# Patient Record
Sex: FEMALE | Race: WHITE | NOT HISPANIC OR LATINO | Employment: OTHER | URBAN - METROPOLITAN AREA
[De-identification: names, ages, dates, MRNs, and addresses within clinical notes are randomized per-mention and may not be internally consistent; named-entity substitution may affect disease eponyms.]

---

## 2017-05-30 ENCOUNTER — HOSPITAL ENCOUNTER (INPATIENT)
Facility: HOSPITAL | Age: 77
LOS: 8 days | Discharge: NURSING, REHAB OR LONG-TERM CARE | DRG: 481 | End: 2017-06-07
Attending: EMERGENCY MEDICINE | Admitting: INTERNAL MEDICINE
Payer: COMMERCIAL

## 2017-05-30 DIAGNOSIS — S72.009A HIP FRACTURE: ICD-10-CM

## 2017-05-30 DIAGNOSIS — M25.552 LEFT HIP PAIN: ICD-10-CM

## 2017-05-30 DIAGNOSIS — F10.229 ALCOHOL DEPENDENCE WITH INTOXICATION WITH COMPLICATION: ICD-10-CM
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
## 2017-05-30 DIAGNOSIS — S72.002A CLOSED LEFT HIP FRACTURE, INITIAL ENCOUNTER: Primary | ICD-10-CM

## 2017-05-30 DIAGNOSIS — F17.210 CIGARETTE NICOTINE DEPENDENCE WITHOUT COMPLICATION: ICD-10-CM

## 2017-05-30 DIAGNOSIS — S72.142A CLOSED INTERTROCHANTERIC FRACTURE OF LEFT FEMUR, INITIAL ENCOUNTER: ICD-10-CM

## 2017-05-30 LAB
ABO + RH BLD: NORMAL
ALBUMIN SERPL BCP-MCNC: 3.7 G/DL
ALP SERPL-CCNC: 69 U/L
ALT SERPL W/O P-5'-P-CCNC: 13 U/L
ANION GAP SERPL CALC-SCNC: 12 MMOL/L
APTT BLDCRRT: 26.6 SEC
AST SERPL-CCNC: 21 U/L
BASOPHILS # BLD AUTO: ABNORMAL K/UL
BASOPHILS NFR BLD: 0 %
BILIRUB SERPL-MCNC: 0.4 MG/DL
BLD GP AB SCN CELLS X3 SERPL QL: NORMAL
BUN SERPL-MCNC: 8 MG/DL
CALCIUM SERPL-MCNC: 9.1 MG/DL
CHLORIDE SERPL-SCNC: 101 MMOL/L
CO2 SERPL-SCNC: 24 MMOL/L
CREAT SERPL-MCNC: 0.6 MG/DL
DIFFERENTIAL METHOD: ABNORMAL
EOSINOPHIL # BLD AUTO: ABNORMAL K/UL
EOSINOPHIL NFR BLD: 1 %
ERYTHROCYTE [DISTWIDTH] IN BLOOD BY AUTOMATED COUNT: 14.1 %
EST. GFR  (AFRICAN AMERICAN): >60 ML/MIN/1.73 M^2
EST. GFR  (NON AFRICAN AMERICAN): >60 ML/MIN/1.73 M^2
ETHANOL SERPL-MCNC: 106 MG/DL
GLUCOSE SERPL-MCNC: 108 MG/DL
HCT VFR BLD AUTO: 39.5 %
HGB BLD-MCNC: 13.3 G/DL
INR PPP: 1
LYMPHOCYTES # BLD AUTO: ABNORMAL K/UL
LYMPHOCYTES NFR BLD: 7 %
MCH RBC QN AUTO: 32.8 PG
MCHC RBC AUTO-ENTMCNC: 33.7 %
MCV RBC AUTO: 97 FL
MONOCYTES # BLD AUTO: ABNORMAL K/UL
MONOCYTES NFR BLD: 5 %
NEUTROPHILS NFR BLD: 83 %
NEUTS BAND NFR BLD MANUAL: 4 %
PLATELET # BLD AUTO: 340 K/UL
PLATELET BLD QL SMEAR: ABNORMAL
PMV BLD AUTO: 6.8 FL
POTASSIUM SERPL-SCNC: 3.5 MMOL/L
PROT SERPL-MCNC: 6.8 G/DL
PROTHROMBIN TIME: 10.3 SEC
RBC # BLD AUTO: 4.06 M/UL
SODIUM SERPL-SCNC: 137 MMOL/L
WBC # BLD AUTO: 24.2 K/UL

## 2017-05-30 PROCEDURE — 96375 TX/PRO/DX INJ NEW DRUG ADDON: CPT

## 2017-05-30 PROCEDURE — 86900 BLOOD TYPING SEROLOGIC ABO: CPT

## 2017-05-30 PROCEDURE — 36415 COLL VENOUS BLD VENIPUNCTURE: CPT

## 2017-05-30 PROCEDURE — 96374 THER/PROPH/DIAG INJ IV PUSH: CPT

## 2017-05-30 PROCEDURE — 85730 THROMBOPLASTIN TIME PARTIAL: CPT

## 2017-05-30 PROCEDURE — 85610 PROTHROMBIN TIME: CPT

## 2017-05-30 PROCEDURE — 80053 COMPREHEN METABOLIC PANEL: CPT

## 2017-05-30 PROCEDURE — 85027 COMPLETE CBC AUTOMATED: CPT

## 2017-05-30 PROCEDURE — 93005 ELECTROCARDIOGRAM TRACING: CPT

## 2017-05-30 PROCEDURE — 99285 EMERGENCY DEPT VISIT HI MDM: CPT | Mod: 25

## 2017-05-30 PROCEDURE — 80320 DRUG SCREEN QUANTALCOHOLS: CPT

## 2017-05-30 PROCEDURE — 12000002 HC ACUTE/MED SURGE SEMI-PRIVATE ROOM

## 2017-05-30 PROCEDURE — 85007 BL SMEAR W/DIFF WBC COUNT: CPT

## 2017-05-30 PROCEDURE — 51702 INSERT TEMP BLADDER CATH: CPT

## 2017-05-30 PROCEDURE — 63600175 PHARM REV CODE 636 W HCPCS: Performed by: EMERGENCY MEDICINE

## 2017-05-30 PROCEDURE — 86901 BLOOD TYPING SEROLOGIC RH(D): CPT

## 2017-05-30 RX ORDER — HYDROMORPHONE HYDROCHLORIDE 1 MG/ML
0.5 INJECTION, SOLUTION INTRAMUSCULAR; INTRAVENOUS; SUBCUTANEOUS
Status: COMPLETED | OUTPATIENT
Start: 2017-05-30 | End: 2017-05-30

## 2017-05-30 RX ORDER — ONDANSETRON 2 MG/ML
4 INJECTION INTRAMUSCULAR; INTRAVENOUS
Status: COMPLETED | OUTPATIENT
Start: 2017-05-30 | End: 2017-05-30

## 2017-05-30 RX ORDER — MORPHINE SULFATE 4 MG/ML
4 INJECTION, SOLUTION INTRAMUSCULAR; INTRAVENOUS
Status: COMPLETED | OUTPATIENT
Start: 2017-05-30 | End: 2017-05-30

## 2017-05-30 RX ADMIN — HYDROMORPHONE HYDROCHLORIDE 0.5 MG: 1 INJECTION, SOLUTION INTRAMUSCULAR; INTRAVENOUS; SUBCUTANEOUS at 11:05

## 2017-05-30 RX ADMIN — ONDANSETRON 4 MG: 2 INJECTION INTRAMUSCULAR; INTRAVENOUS at 09:05

## 2017-05-30 RX ADMIN — MORPHINE SULFATE 4 MG: 4 INJECTION INTRAVENOUS at 09:05

## 2017-05-31 ENCOUNTER — ANESTHESIA (OUTPATIENT)
Dept: SURGERY | Facility: HOSPITAL | Age: 77
DRG: 481 | End: 2017-05-31
Payer: COMMERCIAL

## 2017-05-31 ENCOUNTER — ANESTHESIA EVENT (OUTPATIENT)
Dept: SURGERY | Facility: HOSPITAL | Age: 77
DRG: 481 | End: 2017-05-31
Payer: COMMERCIAL

## 2017-05-31 ENCOUNTER — SURGERY (OUTPATIENT)
Age: 77
End: 2017-05-31
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
## 2017-05-31 PROBLEM — F17.210 DEPENDENCE ON NICOTINE FROM CIGARETTES: Status: ACTIVE | Noted: 2017-05-31

## 2017-05-31 PROBLEM — D72.829 LEUKOCYTOSIS: Status: ACTIVE | Noted: 2017-05-31

## 2017-05-31 PROBLEM — F10.229 ALCOHOL DEPENDENCE WITH INTOXICATION: Status: ACTIVE | Noted: 2017-05-31

## 2017-05-31 PROBLEM — F10.920 ALCOHOLIC INTOXICATION WITHOUT COMPLICATION: Status: ACTIVE | Noted: 2017-05-31

## 2017-05-31 PROBLEM — R94.31 ABNORMAL EKG: Status: ACTIVE | Noted: 2017-05-31

## 2017-05-31 LAB
APTT BLDCRRT: 28.9 SEC
BACTERIA #/AREA URNS HPF: ABNORMAL /HPF
BASOPHILS # BLD AUTO: 0 K/UL
BASOPHILS NFR BLD: 0.1 %
BILIRUB UR QL STRIP: NEGATIVE
CLARITY UR: ABNORMAL
COLOR UR: YELLOW
DIFFERENTIAL METHOD: ABNORMAL
EOSINOPHIL # BLD AUTO: 0 K/UL
EOSINOPHIL NFR BLD: 0 %
ERYTHROCYTE [DISTWIDTH] IN BLOOD BY AUTOMATED COUNT: 13.8 %
ETHANOL SERPL-MCNC: <10 MG/DL
GLUCOSE UR QL STRIP: ABNORMAL
HCT VFR BLD AUTO: 39.2 %
HGB BLD-MCNC: 13.1 G/DL
HGB UR QL STRIP: ABNORMAL
INR PPP: 1
KETONES UR QL STRIP: ABNORMAL
LEUKOCYTE ESTERASE UR QL STRIP: ABNORMAL
LYMPHOCYTES # BLD AUTO: 1.2 K/UL
LYMPHOCYTES NFR BLD: 6.6 %
MCH RBC QN AUTO: 32.8 PG
MCHC RBC AUTO-ENTMCNC: 33.4 %
MCV RBC AUTO: 98 FL
MICROSCOPIC COMMENT: ABNORMAL
MONOCYTES # BLD AUTO: 1.2 K/UL
MONOCYTES NFR BLD: 6.8 %
NEUTROPHILS # BLD AUTO: 15.4 K/UL
NEUTROPHILS NFR BLD: 86.5 %
NITRITE UR QL STRIP: POSITIVE
PH UR STRIP: 6 [PH] (ref 5–8)
PLATELET # BLD AUTO: 315 K/UL
PMV BLD AUTO: 7.5 FL
PROT UR QL STRIP: ABNORMAL
PROTHROMBIN TIME: 10 SEC
RBC # BLD AUTO: 3.99 M/UL
RBC #/AREA URNS HPF: 2 /HPF (ref 0–4)
SP GR UR STRIP: 1.02 (ref 1–1.03)
SQUAMOUS #/AREA URNS HPF: 1 /HPF
URN SPEC COLLECT METH UR: ABNORMAL
UROBILINOGEN UR STRIP-ACNC: NEGATIVE EU/DL
WBC # BLD AUTO: 17.8 K/UL
WBC #/AREA URNS HPF: >100 /HPF (ref 0–5)

## 2017-05-31 PROCEDURE — 27000221 HC OXYGEN, UP TO 24 HOURS

## 2017-05-31 PROCEDURE — 71000039 HC RECOVERY, EACH ADD'L HOUR: Performed by: ORTHOPAEDIC SURGERY

## 2017-05-31 PROCEDURE — 63600175 PHARM REV CODE 636 W HCPCS: Performed by: ORTHOPAEDIC SURGERY

## 2017-05-31 PROCEDURE — 37000009 HC ANESTHESIA EA ADD 15 MINS: Performed by: ORTHOPAEDIC SURGERY

## 2017-05-31 PROCEDURE — 87088 URINE BACTERIA CULTURE: CPT

## 2017-05-31 PROCEDURE — 80320 DRUG SCREEN QUANTALCOHOLS: CPT

## 2017-05-31 PROCEDURE — 37000008 HC ANESTHESIA 1ST 15 MINUTES: Performed by: ORTHOPAEDIC SURGERY

## 2017-05-31 PROCEDURE — 36415 COLL VENOUS BLD VENIPUNCTURE: CPT

## 2017-05-31 PROCEDURE — 25000003 PHARM REV CODE 250: Performed by: ORTHOPAEDIC SURGERY

## 2017-05-31 PROCEDURE — 87086 URINE CULTURE/COLONY COUNT: CPT

## 2017-05-31 PROCEDURE — 63600175 PHARM REV CODE 636 W HCPCS: Performed by: NURSE PRACTITIONER

## 2017-05-31 PROCEDURE — 63600175 PHARM REV CODE 636 W HCPCS: Performed by: NURSE ANESTHETIST, CERTIFIED REGISTERED

## 2017-05-31 PROCEDURE — 99900103 DSU ONLY-NO CHARGE-INITIAL HR (STAT): Performed by: ORTHOPAEDIC SURGERY

## 2017-05-31 PROCEDURE — 27200651 HC AIRWAY, LMA: Performed by: NURSE ANESTHETIST, CERTIFIED REGISTERED

## 2017-05-31 PROCEDURE — 25000003 PHARM REV CODE 250: Performed by: INTERNAL MEDICINE

## 2017-05-31 PROCEDURE — D9220A PRA ANESTHESIA: Mod: CRNA,,, | Performed by: NURSE ANESTHETIST, CERTIFIED REGISTERED

## 2017-05-31 PROCEDURE — 36000711: Performed by: ORTHOPAEDIC SURGERY

## 2017-05-31 PROCEDURE — 25000003 PHARM REV CODE 250: Performed by: NURSE ANESTHETIST, CERTIFIED REGISTERED

## 2017-05-31 PROCEDURE — 85025 COMPLETE CBC W/AUTO DIFF WBC: CPT

## 2017-05-31 PROCEDURE — 25000003 PHARM REV CODE 250: Performed by: NURSE PRACTITIONER

## 2017-05-31 PROCEDURE — 71000033 HC RECOVERY, INTIAL HOUR: Performed by: ORTHOPAEDIC SURGERY

## 2017-05-31 PROCEDURE — 93005 ELECTROCARDIOGRAM TRACING: CPT

## 2017-05-31 PROCEDURE — 0QH736Z INSERTION OF INTRAMEDULLARY INTERNAL FIXATION DEVICE INTO LEFT UPPER FEMUR, PERCUTANEOUS APPROACH: ICD-10-PCS | Performed by: ORTHOPAEDIC SURGERY

## 2017-05-31 PROCEDURE — 36000710: Performed by: ORTHOPAEDIC SURGERY

## 2017-05-31 PROCEDURE — 85730 THROMBOPLASTIN TIME PARTIAL: CPT

## 2017-05-31 PROCEDURE — 27201423 OPTIME MED/SURG SUP & DEVICES STERILE SUPPLY: Performed by: ORTHOPAEDIC SURGERY

## 2017-05-31 PROCEDURE — 87186 SC STD MICRODIL/AGAR DIL: CPT

## 2017-05-31 PROCEDURE — 87077 CULTURE AEROBIC IDENTIFY: CPT

## 2017-05-31 PROCEDURE — 99223 1ST HOSP IP/OBS HIGH 75: CPT | Mod: ,,, | Performed by: INTERNAL MEDICINE

## 2017-05-31 PROCEDURE — 99900104 DSU ONLY-NO CHARGE-EA ADD'L HR (STAT): Performed by: ORTHOPAEDIC SURGERY

## 2017-05-31 PROCEDURE — D9220A PRA ANESTHESIA: Mod: ANES,,, | Performed by: ANESTHESIOLOGY

## 2017-05-31 PROCEDURE — 85610 PROTHROMBIN TIME: CPT

## 2017-05-31 PROCEDURE — 94761 N-INVAS EAR/PLS OXIMETRY MLT: CPT

## 2017-05-31 PROCEDURE — C1713 ANCHOR/SCREW BN/BN,TIS/BN: HCPCS | Performed by: ORTHOPAEDIC SURGERY

## 2017-05-31 PROCEDURE — 63600175 PHARM REV CODE 636 W HCPCS: Performed by: ANESTHESIOLOGY

## 2017-05-31 PROCEDURE — C1769 GUIDE WIRE: HCPCS | Performed by: ORTHOPAEDIC SURGERY

## 2017-05-31 PROCEDURE — 11000001 HC ACUTE MED/SURG PRIVATE ROOM

## 2017-05-31 PROCEDURE — 81000 URINALYSIS NONAUTO W/SCOPE: CPT

## 2017-05-31 PROCEDURE — 63600175 PHARM REV CODE 636 W HCPCS: Performed by: INTERNAL MEDICINE

## 2017-05-31 DEVICE — NAIL TFNA 130DEG 11MM L STRL: Type: IMPLANTABLE DEVICE | Site: FEMUR | Status: FUNCTIONAL

## 2017-05-31 DEVICE — SCREW STRDRV REC T25 5X58 TTNM: Type: IMPLANTABLE DEVICE | Site: FEMUR | Status: FUNCTIONAL

## 2017-05-31 RX ORDER — ONDANSETRON 2 MG/ML
4 INJECTION INTRAMUSCULAR; INTRAVENOUS DAILY PRN
Status: DISCONTINUED | OUTPATIENT
Start: 2017-05-31 | End: 2017-05-31

## 2017-05-31 RX ORDER — PROPOFOL 10 MG/ML
VIAL (ML) INTRAVENOUS
Status: DISCONTINUED | OUTPATIENT
Start: 2017-05-31 | End: 2017-05-31

## 2017-05-31 RX ORDER — ENOXAPARIN SODIUM 100 MG/ML
40 INJECTION SUBCUTANEOUS
Status: DISCONTINUED | OUTPATIENT
Start: 2017-06-01 | End: 2017-06-07 | Stop reason: HOSPADM

## 2017-05-31 RX ORDER — IBUPROFEN 200 MG
16 TABLET ORAL
Status: DISCONTINUED | OUTPATIENT
Start: 2017-05-31 | End: 2017-06-07 | Stop reason: HOSPADM

## 2017-05-31 RX ORDER — METHOCARBAMOL 500 MG/1
500 TABLET, FILM COATED ORAL EVERY 6 HOURS PRN
Status: DISCONTINUED | OUTPATIENT
Start: 2017-05-31 | End: 2017-06-07 | Stop reason: HOSPADM

## 2017-05-31 RX ORDER — FENTANYL CITRATE 50 UG/ML
INJECTION, SOLUTION INTRAMUSCULAR; INTRAVENOUS
Status: DISCONTINUED | OUTPATIENT
Start: 2017-05-31 | End: 2017-05-31

## 2017-05-31 RX ORDER — LIDOCAINE HCL/PF 100 MG/5ML
SYRINGE (ML) INTRAVENOUS
Status: DISCONTINUED | OUTPATIENT
Start: 2017-05-31 | End: 2017-05-31

## 2017-05-31 RX ORDER — THIAMINE HCL 100 MG
100 TABLET ORAL DAILY
Status: DISCONTINUED | OUTPATIENT
Start: 2017-06-01 | End: 2017-05-31

## 2017-05-31 RX ORDER — PHENYLEPHRINE HYDROCHLORIDE 10 MG/ML
INJECTION INTRAVENOUS
Status: DISCONTINUED | OUTPATIENT
Start: 2017-05-31 | End: 2017-05-31

## 2017-05-31 RX ORDER — ACETAMINOPHEN 325 MG/1
650 TABLET ORAL EVERY 6 HOURS PRN
Status: DISCONTINUED | OUTPATIENT
Start: 2017-05-31 | End: 2017-06-07 | Stop reason: HOSPADM

## 2017-05-31 RX ORDER — SODIUM CHLORIDE, SODIUM LACTATE, POTASSIUM CHLORIDE, CALCIUM CHLORIDE 600; 310; 30; 20 MG/100ML; MG/100ML; MG/100ML; MG/100ML
INJECTION, SOLUTION INTRAVENOUS CONTINUOUS
Status: DISCONTINUED | OUTPATIENT
Start: 2017-05-31 | End: 2017-06-01

## 2017-05-31 RX ORDER — HYDROCODONE BITARTRATE AND ACETAMINOPHEN 5; 325 MG/1; MG/1
1 TABLET ORAL EVERY 4 HOURS PRN
Status: DISCONTINUED | OUTPATIENT
Start: 2017-05-31 | End: 2017-06-07 | Stop reason: HOSPADM

## 2017-05-31 RX ORDER — MUPIROCIN 20 MG/G
1 OINTMENT TOPICAL
Status: COMPLETED | OUTPATIENT
Start: 2017-05-31 | End: 2017-05-31

## 2017-05-31 RX ORDER — LORAZEPAM 2 MG/ML
0.25 INJECTION INTRAMUSCULAR
Status: DISCONTINUED | OUTPATIENT
Start: 2017-05-31 | End: 2017-05-31

## 2017-05-31 RX ORDER — MEPERIDINE HYDROCHLORIDE 25 MG/ML
12.5 INJECTION INTRAMUSCULAR; INTRAVENOUS; SUBCUTANEOUS ONCE AS NEEDED
Status: DISCONTINUED | OUTPATIENT
Start: 2017-05-31 | End: 2017-05-31

## 2017-05-31 RX ORDER — PANTOPRAZOLE SODIUM 40 MG/1
40 TABLET, DELAYED RELEASE ORAL DAILY
Status: DISCONTINUED | OUTPATIENT
Start: 2017-05-31 | End: 2017-06-07 | Stop reason: HOSPADM

## 2017-05-31 RX ORDER — IBUPROFEN 200 MG
24 TABLET ORAL
Status: DISCONTINUED | OUTPATIENT
Start: 2017-05-31 | End: 2017-06-07 | Stop reason: HOSPADM

## 2017-05-31 RX ORDER — GLUCAGON 1 MG
1 KIT INJECTION
Status: DISCONTINUED | OUTPATIENT
Start: 2017-05-31 | End: 2017-06-07 | Stop reason: HOSPADM

## 2017-05-31 RX ORDER — MORPHINE SULFATE 4 MG/ML
4 INJECTION, SOLUTION INTRAMUSCULAR; INTRAVENOUS EVERY 4 HOURS PRN
Status: DISCONTINUED | OUTPATIENT
Start: 2017-05-31 | End: 2017-06-07 | Stop reason: HOSPADM

## 2017-05-31 RX ORDER — SODIUM CHLORIDE 0.9 % (FLUSH) 0.9 %
3 SYRINGE (ML) INJECTION
Status: DISCONTINUED | OUTPATIENT
Start: 2017-05-31 | End: 2017-06-07 | Stop reason: HOSPADM

## 2017-05-31 RX ORDER — SODIUM CHLORIDE 0.9 % (FLUSH) 0.9 %
3 SYRINGE (ML) INJECTION EVERY 8 HOURS
Status: DISCONTINUED | OUTPATIENT
Start: 2017-05-31 | End: 2017-05-31

## 2017-05-31 RX ORDER — CEFAZOLIN SODIUM 2 G/50ML
2 SOLUTION INTRAVENOUS
Status: COMPLETED | OUTPATIENT
Start: 2017-05-31 | End: 2017-05-31

## 2017-05-31 RX ORDER — RAMELTEON 8 MG/1
8 TABLET ORAL NIGHTLY PRN
Status: DISCONTINUED | OUTPATIENT
Start: 2017-05-31 | End: 2017-06-07 | Stop reason: HOSPADM

## 2017-05-31 RX ORDER — DIAZEPAM 5 MG/1
5 TABLET ORAL EVERY 6 HOURS PRN
Status: DISCONTINUED | OUTPATIENT
Start: 2017-05-31 | End: 2017-06-03

## 2017-05-31 RX ORDER — MIDAZOLAM HYDROCHLORIDE 1 MG/ML
INJECTION, SOLUTION INTRAMUSCULAR; INTRAVENOUS
Status: DISCONTINUED | OUTPATIENT
Start: 2017-05-31 | End: 2017-05-31

## 2017-05-31 RX ORDER — SODIUM CHLORIDE 9 MG/ML
INJECTION, SOLUTION INTRAVENOUS CONTINUOUS
Status: DISCONTINUED | OUTPATIENT
Start: 2017-05-31 | End: 2017-06-02

## 2017-05-31 RX ORDER — SODIUM CHLORIDE 9 MG/ML
INJECTION, SOLUTION INTRAVENOUS CONTINUOUS
Status: DISCONTINUED | OUTPATIENT
Start: 2017-05-31 | End: 2017-05-31

## 2017-05-31 RX ORDER — HYDROMORPHONE HYDROCHLORIDE 2 MG/ML
0.2 INJECTION, SOLUTION INTRAMUSCULAR; INTRAVENOUS; SUBCUTANEOUS EVERY 5 MIN PRN
Status: DISCONTINUED | OUTPATIENT
Start: 2017-05-31 | End: 2017-05-31

## 2017-05-31 RX ORDER — CEFAZOLIN SODIUM 2 G/50ML
2 SOLUTION INTRAVENOUS
Status: COMPLETED | OUTPATIENT
Start: 2017-06-01 | End: 2017-06-01

## 2017-05-31 RX ORDER — FOLIC ACID 1 MG/1
1 TABLET ORAL DAILY
Status: DISCONTINUED | OUTPATIENT
Start: 2017-06-01 | End: 2017-05-31

## 2017-05-31 RX ORDER — ACETAMINOPHEN 10 MG/ML
1000 INJECTION, SOLUTION INTRAVENOUS EVERY 8 HOURS
Status: COMPLETED | OUTPATIENT
Start: 2017-05-31 | End: 2017-06-01

## 2017-05-31 RX ADMIN — PHENYLEPHRINE HYDROCHLORIDE 100 MCG: 10 INJECTION INTRAVENOUS at 08:05

## 2017-05-31 RX ADMIN — SODIUM CHLORIDE, SODIUM LACTATE, POTASSIUM CHLORIDE, AND CALCIUM CHLORIDE: .6; .31; .03; .02 INJECTION, SOLUTION INTRAVENOUS at 08:05

## 2017-05-31 RX ADMIN — MORPHINE SULFATE 4 MG: 4 INJECTION INTRAVENOUS at 12:05

## 2017-05-31 RX ADMIN — MIDAZOLAM 2 MG: 1 INJECTION INTRAMUSCULAR; INTRAVENOUS at 08:05

## 2017-05-31 RX ADMIN — LIDOCAINE HYDROCHLORIDE 75 MG: 20 INJECTION, SOLUTION INTRAVENOUS at 08:05

## 2017-05-31 RX ADMIN — MUPIROCIN 1 G: 20 OINTMENT TOPICAL at 08:05

## 2017-05-31 RX ADMIN — SODIUM CHLORIDE: 0.9 INJECTION, SOLUTION INTRAVENOUS at 06:05

## 2017-05-31 RX ADMIN — FOLIC ACID: 5 INJECTION, SOLUTION INTRAMUSCULAR; INTRAVENOUS; SUBCUTANEOUS at 08:05

## 2017-05-31 RX ADMIN — CEFAZOLIN SODIUM 2 G: 2 SOLUTION INTRAVENOUS at 08:05

## 2017-05-31 RX ADMIN — MORPHINE SULFATE 4 MG: 4 INJECTION INTRAVENOUS at 06:05

## 2017-05-31 RX ADMIN — PROPOFOL 120 MG: 10 INJECTION, EMULSION INTRAVENOUS at 08:05

## 2017-05-31 RX ADMIN — METHOCARBAMOL 500 MG: 500 TABLET ORAL at 04:05

## 2017-05-31 RX ADMIN — FENTANYL CITRATE 100 MCG: 50 INJECTION INTRAMUSCULAR; INTRAVENOUS at 08:05

## 2017-05-31 RX ADMIN — MORPHINE SULFATE 4 MG: 4 INJECTION INTRAVENOUS at 01:05

## 2017-05-31 RX ADMIN — HYDROMORPHONE HYDROCHLORIDE 0.2 MG: 2 INJECTION INTRAMUSCULAR; INTRAVENOUS; SUBCUTANEOUS at 09:05

## 2017-05-31 RX ADMIN — ACETAMINOPHEN 1000 MG: 10 INJECTION, SOLUTION INTRAVENOUS at 10:05

## 2017-05-31 RX ADMIN — CEFTRIAXONE 1 G: 1 INJECTION, SOLUTION INTRAVENOUS at 11:05

## 2017-05-31 NOTE — PLAN OF CARE
Problem: Patient Care Overview  Goal: Plan of Care Review  Patient remained uncomfortable throughout the night related to muscle spasms. Received orders for Robaxin. Patient mentions general pain relief with Morphine given earlier in shift. Haynes with adequate urine output. VS stable. NPO for possible surgery this am. NS at 125cc started for hydration. Call light at bedside.

## 2017-05-31 NOTE — PLAN OF CARE
05/31/17 1250   PRE-TX-O2-ETCO2   O2 Device (Oxygen Therapy) nasal cannula   $ Is the patient on Oxygen? Yes   Flow (L/min) 2   Oxygen Concentration (%) 28   SpO2 97 %   Pulse Oximetry Type Intermittent   $ Pulse Oximetry - Multiple Charge Pulse Oximetry - Multiple   Ready to Wean/Extubation Screen   FIO2<60 (chart decimal) 0.28

## 2017-05-31 NOTE — ASSESSMENT & PLAN NOTE
Health hazards associated with cigarette smoking were reviewed with patient and cessation was encouraged. Nicotine replacement and counseling options were discussed.

## 2017-05-31 NOTE — ASSESSMENT & PLAN NOTE
Maintain fall and seizure precautions. Continue IVF hydration- Banana bag x 1-- follow ETOH. Nursing to check CIWA-AR, monitoring closely for DTs and treat with PRN Benzodiazepines as needed. Use IV anti-emetics as needed.  Follow serum lytes. Discussed dangers of alcohol abuse particularly post-operatively following hip repair.  Consult  to provider cessation resources. Daily MVI, thiamine, folic acid.

## 2017-05-31 NOTE — CONSULTS
[facility]  Orthopedics  Consult Note    Patient Name: Omari Diallo  MRN: [id number]  Admission Date: 5/30/2017  Hospital Length of Stay: 0 days  Attending Provider: Niall Fleming MD  Primary Care Provider: Juan J Aldrich MD    Patient information was obtained from patient and ER records.     Consults  Subjective:     Principal Problem:Closed intertrochanteric fracture of left femur    Chief Complaint:   Chief Complaint   Patient presents with    Hip Pain     pt reports tripped and fell tonight, c/o L hip pain         HPI: Pt presents with LEFT hip pain s/p fall from standing height.  Had immediate pain and inability to bear weight on the extremity.  Presented to ED where workup demonstrate a LEFT hip fracture.  Admitted for medial optimization and orthopedic intervention.  This is a closed injury.      No past medical history on file.    No past surgical history on file.    Review of patient's allergies indicates:  No Known Allergies    No current facility-administered medications for this encounter.      No current outpatient prescriptions on file.     Family History     None        Social History Main Topics    Smoking status: Not on file    Smokeless tobacco: Not on file    Alcohol use Not on file    Drug use: Unknown    Sexual activity: Not on file     ROS   Constitutional: Negative for fever.   HENT: Negative for ear pain, rhinorrhea and sore throat.    Eyes: Negative for pain and visual disturbance.   Respiratory: Negative for cough and shortness of breath.    Cardiovascular: Negative for chest pain.   Gastrointestinal: Negative for abdominal pain, diarrhea, nausea and vomiting.   Genitourinary: Negative for difficulty urinating.   Musculoskeletal: Positive for arthralgias. Negative for gait problem, myalgias and neck pain.   Skin: Negative for rash.   Neurological: Negative for weakness, numbness and headaches.   All other systems reviewed and are negative.    Objective:     Vital  "Signs (Most Recent):    Vital Signs (24h Range):        Weight: 54.4 kg (120 lb)  Height: 5' 4" (162.6 cm)  Body mass index is 20.6 kg/m².    No intake or output data in the 24 hours ending 05/30/17 2227    Ortho/SPM Exam    Significant Labs: All pertinent labs within the past 24 hours have been reviewed.    Significant Imaging:   Narrative     Crosstable view of the left hip and AP and lateral views of the femur are provided.  There is a nondisplaced slightly angulated intertrochanteric fracture of the left femur.  The femoral head remains in the acetabulum.  The shaft of the femur is intact to the knee.         Awake/alert/oriented x3, No acute distress, Afebrile, Vital signs stable  Normocephalic, Atraumatic  Heart is beating at normal rate  Good inspiratory effort with unlaboured breathing  Abdomen soft/nondistended/nontender    left lower extremity  Resting position of the limb is shortened and externally rotated compared to contralateral  Motor intact L2-S1  Sensation intact L2-S1  2+ PT/DP pulses  Skin intact      Assessment/Plan:     Active Diagnoses:    Diagnosis Date Noted POA    PRINCIPAL PROBLEM:  Closed intertrochanteric fracture of left femur [S72.142A] 05/30/2017 Yes      Problems Resolved During this Admission:    Diagnosis Date Noted Date Resolved POA     76yo F with LEFT hip fracture    Will proceed to OR for LEFT hip intramedullary nailing  Consent  NPO    Mau Segovia MD  [facility]  "

## 2017-05-31 NOTE — PLAN OF CARE
Admit assessment completed. Denies any medical history and only surgery is appendectomy. Refuses at this time for gown to be place.  SCD to right leg placed.

## 2017-05-31 NOTE — PLAN OF CARE
Aware of patient status  Will be assuming care  Placing case request for LEFT intramedullary nailing  Will schedule for tomorrow afternoon  Medical optimization/clearance per hospital medicine  NPO at midnight   Will examine patient tomorrow        [facility]

## 2017-05-31 NOTE — ASSESSMENT & PLAN NOTE
NPO.  Bedrest.  Requiring IV narcotics for pain control.  Ortho to see today.  Fall precautions. Pre-op CXR okay, UA pending.  Abnormal EKG-- consult cardiology for pre-operative evaluation.

## 2017-05-31 NOTE — PLAN OF CARE
I completed the assessment with the pt and her spouse, Sumit [phone] at bedside. The pt denies HH but had it in the past and does not want to ever use [facility] again. She ambulates with a walker. She sees Dr. Aldrich as her PCP and has [employer] insurance. No questions or concerns at this time. Tracy Hernandez [facility]     05/31/17 1250   Discharge Assessment   Assessment Type Discharge Planning Assessment   Confirmed/corrected address and phone number on facesheet? Yes   Assessment information obtained from? Patient;Caregiver   Communicated expected length of stay with patient/caregiver no   Type of Healthcare Directive Received (Spouse Sumit [phone] )   If Healthcare Directive is received, is it scanned into Epic? no (comment)   Prior to hospitilization cognitive status: Alert/Oriented   Prior to hospitalization functional status: Independent   Current cognitive status: Alert/Oriented   Current Functional Status: Independent   Arrived From home or self-care   Lives With spouse   Able to Return to Prior Arrangements yes   Is patient able to care for self after discharge? Yes   Readmission Within The Last 30 Days no previous admission in last 30 days   Patient currently being followed by outpatient case management? No   Patient currently receives home health services? No   Does the patient currently use HME? No   Patient currently receives private duty nursing? No   Patient currently receives any other outside agency services? No   Equipment Currently Used at Home walker, standard   Do you have any problems affording any of your prescribed medications? No   Is the patient taking medications as prescribed? yes   Do you have any financial concerns preventing you from receiving the healthcare you need? No  ([employer] )   Does the patient have transportation to healthcare appointments? Yes   Transportation Available family or friend will provide   On Dialysis? No   Does the patient receive services at the  [facility]? No   Are there any open cases? No   Discharge Plan A Home   Discharge Plan B Home with family   Patient/Family In Agreement With Plan yes

## 2017-05-31 NOTE — ED PROVIDER NOTES
Encounter Date: 5/30/2017       History     Chief Complaint   Patient presents with    Hip Pain     pt reports tripped and fell tonight, c/o L hip pain      Review of patient's allergies indicates:  No Known Allergies  Tripped and fell while walking in her house.  She was having alcohol.  She denies any headache neck pain back pain abdominal pain chest pain or any other complaints.  She is complaining of left hip pain.  She was given morphine and Zofran in route.          No past medical history on file.  No past surgical history on file.  No family history on file.  Social History   Substance Use Topics    Smoking status: Not on file    Smokeless tobacco: Not on file    Alcohol use Not on file     Review of Systems   Constitutional: Negative for fever.   HENT: Negative for ear pain, rhinorrhea and sore throat.    Eyes: Negative for pain and visual disturbance.   Respiratory: Negative for cough and shortness of breath.    Cardiovascular: Negative for chest pain.   Gastrointestinal: Negative for abdominal pain, diarrhea, nausea and vomiting.   Genitourinary: Negative for difficulty urinating.   Musculoskeletal: Positive for arthralgias. Negative for gait problem, myalgias and neck pain.   Skin: Negative for rash.   Neurological: Negative for weakness, numbness and headaches.   All other systems reviewed and are negative.      Physical Exam     Initial Vitals   BP Pulse Resp Temp SpO2   -- -- -- -- --     Physical Exam    Nursing note and vitals reviewed.  Constitutional: She appears well-developed and well-nourished. No distress.   HENT:   Head: Normocephalic and atraumatic.   Eyes: Conjunctivae and EOM are normal. Pupils are equal, round, and reactive to light.   Neck: Normal range of motion. Neck supple.   Cardiovascular: Normal rate, regular rhythm, normal heart sounds and intact distal pulses.   Pulmonary/Chest: Breath sounds normal. No respiratory distress. She has no decreased breath sounds. She has no  wheezes. She has no rhonchi. She has no rales.   Abdominal: Soft. Bowel sounds are normal. There is no tenderness.   Musculoskeletal: Normal range of motion.   + left hip pain w/ range of motion   Neurological: She is alert and oriented to person, place, and time. No sensory deficit.   5/5 strength and sensation in the b/l feet.    Skin: Skin is warm and dry.   Psychiatric: She has a normal mood and affect.         ED Course   Procedures  Labs Reviewed   CBC W/ AUTO DIFFERENTIAL - Abnormal; Notable for the following:        Result Value    WBC 24.20 (*)     MCH 32.8 (*)     MPV 6.8 (*)     Gran% 83.0 (*)     Lymph% 7.0 (*)     All other components within normal limits   COMPREHENSIVE METABOLIC PANEL   PROTIME-INR   APTT   ALCOHOL,MEDICAL (ETHANOL)   TYPE & SCREEN             Medical Decision Making:   Pt has a closed intratrochanteric hip fracture.  I spoke with Dr. Luca greenberg in the morning.  I spoke with the hospitalist to admit the patient.  She does have a white count of 24 but I believe this is likely stress reaction.                   ED Course     Clinical Impression:   The primary encounter diagnosis was Closed left hip fracture, initial encounter. Diagnoses of Hip fracture and Left hip pain were also pertinent to this visit.          Niall Fleming MD  05/30/17 1982       Niall Fleming MD  05/30/17 4674

## 2017-05-31 NOTE — SUBJECTIVE & OBJECTIVE
History reviewed. No pertinent past medical history.    Past Surgical History:   Procedure Laterality Date    APPENDECTOMY         Review of patient's allergies indicates:  No Known Allergies    No current facility-administered medications on file prior to encounter.      No current outpatient prescriptions on file prior to encounter.     Family History     Problem Relation (Age of Onset)    Alcohol abuse Father    Cancer Mother        Social History Main Topics    Smoking status: Current Every Day Smoker     Packs/day: 0.25     Years: 50.00     Types: Cigarettes    Smokeless tobacco: Not on file    Alcohol use 1.8 oz/week     3 Glasses of wine per week      Comment: every evening    Drug use: No    Sexual activity: Not on file     Review of Systems   Constitutional: Positive for activity change. Negative for appetite change, chills, fatigue and fever.   HENT: Negative for congestion, postnasal drip, sore throat and trouble swallowing.    Eyes: Negative for photophobia and visual disturbance.   Respiratory: Negative for cough, chest tightness and shortness of breath.    Cardiovascular: Negative for chest pain and leg swelling.   Gastrointestinal: Negative for abdominal distention, abdominal pain, constipation, diarrhea, nausea and vomiting.   Genitourinary: Negative for difficulty urinating, dysuria and flank pain.   Musculoskeletal: Positive for arthralgias, gait problem and myalgias. Negative for neck pain.   Neurological: Negative for dizziness, weakness, light-headedness and headaches.   Psychiatric/Behavioral: Negative for agitation, confusion and sleep disturbance. The patient is not nervous/anxious.      Objective:     Vital Signs (Most Recent):  Temp: 97.9 °F (36.6 °C) (05/31/17 0500)  Pulse: 77 (05/31/17 0500)  Resp: 18 (05/31/17 0500)  BP: (!) 146/88 (05/31/17 0500)  SpO2: 98 % (05/31/17 0500) Vital Signs (24h Range):  Temp:  [97.9 °F (36.6 °C)] 97.9 °F (36.6 °C)  Pulse:  [66-77] 77  Resp:  [18]  18  SpO2:  [90 %-98 %] 98 %  BP: (141-179)/(73-88) 146/88     Weight: 54.4 kg (120 lb)  Body mass index is 20.6 kg/m².    Physical Exam   Constitutional: She is oriented to person, place, and time. No distress.   Cachectic    HENT:   Head: Normocephalic and atraumatic.   Eyes: Conjunctivae and EOM are normal. Pupils are equal, round, and reactive to light.   Neck: Normal range of motion. Neck supple. No thyromegaly present.   Cardiovascular: Normal rate, regular rhythm, normal heart sounds and intact distal pulses.    Pulmonary/Chest: Effort normal and breath sounds normal. No respiratory distress.   Abdominal: Soft. Bowel sounds are normal. She exhibits no distension. There is no tenderness.   Musculoskeletal: She exhibits tenderness (tenderness to LEFT lateral hip.). She exhibits no edema.   Unable to complete LEFT hip ROM 2/2 pain.  LEFT foot warm with good sensation, good strength with mobility.   Neurological: She is alert and oriented to person, place, and time. No cranial nerve deficit.   Skin: Skin is warm and dry. Capillary refill takes less than 2 seconds. No pallor.   Psychiatric:   Patient short, abrupt with answers.  Pessimistic outlook on situation and state of health.        Significant Labs:   CBC:   Recent Labs  Lab 05/30/17 2023   WBC 24.20*   HGB 13.3   HCT 39.5        CMP:   Recent Labs  Lab 05/30/17 2023      K 3.5      CO2 24      BUN 8   CREATININE 0.6   CALCIUM 9.1   PROT 6.8   ALBUMIN 3.7   BILITOT 0.4   ALKPHOS 69   AST 21   ALT 13   ANIONGAP 12   EGFRNONAA >60     Coagulation:   Recent Labs  Lab 05/31/17  0546   INR 1.0   APTT 28.9       Significant Imaging:   CXR: +hyperinflation, flat diaphragms.  No infiltrate or effusion.  EKG: SR 77 with flat T waves in inferiolateral leads.  CT Head: Negative.

## 2017-05-31 NOTE — ED NOTES
Presents to the ER with c/o left hip pain that is secondary to fall that occurred just prior to arrival. Patient reports that the rubber sole on her shoe got caught on the carpet and she fell. Denies hitting head or LOC. Limited ROM to left extremity that is secondary to hip pain. Left leg is shortened and rotated, +sensation to left lower extremity, + left pedal pulse, capillary refill to left lower extremity is < 3 seconds. Patient reports that she had a glass of wine prior to fall. Mucous membranes are pink and moist. Skin is warm, dry and intact. Lungs are clear bilaterally, respirations are regular and unlabored. Denies cough, congestion, rhinorrhea or SOB. BS active x4, no tenderness with palpation, abd is soft and not distended. Denies any appetite or activity change. S1S2, capillary refill is < 2 seconds. Denies dysuria, difficulty urinating, frequency, numbness, tingling or weakness. LINDA LOPEZ

## 2017-05-31 NOTE — H&P
"[facility] Hospital Medicine  History & Physical    Patient Name: Omari Diallo  MRN: [id number]  Admission Date: 5/30/2017  Attending Physician: New Winn MD   Primary Care Provider: Juan J Aldrich MD         Patient information was obtained from patient and ER records.     Subjective:     Principal Problem:Closed intertrochanteric fracture of left femur    Chief Complaint:   Chief Complaint   Patient presents with    Hip Pain     pt reports tripped and fell tonight, c/o L hip pain         HPI: Omari Diallo is a 77 y.o. With PMHx significant for alcohol abuse.  She was admitted to the service of hospital medicine with LEFT hip fracture.  She experienced a trip and fall while walking in her house.   She reports after falling she experienced LEFT hip pain and was unable to bear weight on the LEFT leg. She was drinking wine as she does every night since "the 80s".  She reports she recently cut back to 3 glasses of wine nightly.  She has been in ETOH rehab before.  She states she does get "shaky" when she doesn't drink.  She denies any seizure history.  She denies any headache, neck pain, back pain, abdominal pain, chest pain, or any other complaints.  She is complaining of left hip pain that is accompanied by muscle spasms. She reports the pain is improved with IV morphine and oral Robaxin. She denies any medical history.  She denies any previous cardiac workup.  She smokes 1/4 ppd for ~50 years.    History reviewed. No pertinent past medical history.    Past Surgical History:   Procedure Laterality Date    APPENDECTOMY         Review of patient's allergies indicates:  No Known Allergies    No current facility-administered medications on file prior to encounter.      No current outpatient prescriptions on file prior to encounter.     Family History     Problem Relation (Age of Onset)    Alcohol abuse Father    Cancer Mother        Social History Main Topics    Smoking status: Current Every Day " Smoker     Packs/day: 0.25     Years: 50.00     Types: Cigarettes    Smokeless tobacco: Not on file    Alcohol use 1.8 oz/week     3 Glasses of wine per week      Comment: every evening    Drug use: No    Sexual activity: Not on file     Review of Systems   Constitutional: Positive for activity change. Negative for appetite change, chills, fatigue and fever.   HENT: Negative for congestion, postnasal drip, sore throat and trouble swallowing.    Eyes: Negative for photophobia and visual disturbance.   Respiratory: Negative for cough, chest tightness and shortness of breath.    Cardiovascular: Negative for chest pain and leg swelling.   Gastrointestinal: Negative for abdominal distention, abdominal pain, constipation, diarrhea, nausea and vomiting.   Genitourinary: Negative for difficulty urinating, dysuria and flank pain.   Musculoskeletal: Positive for arthralgias, gait problem and myalgias. Negative for neck pain.   Neurological: Negative for dizziness, weakness, light-headedness and headaches.   Psychiatric/Behavioral: Negative for agitation, confusion and sleep disturbance. The patient is not nervous/anxious.      Objective:     Vital Signs (Most Recent):  Temp: 97.9 °F (36.6 °C) (05/31/17 0500)  Pulse: 77 (05/31/17 0500)  Resp: 18 (05/31/17 0500)  BP: (!) 146/88 (05/31/17 0500)  SpO2: 98 % (05/31/17 0500) Vital Signs (24h Range):  Temp:  [97.9 °F (36.6 °C)] 97.9 °F (36.6 °C)  Pulse:  [66-77] 77  Resp:  [18] 18  SpO2:  [90 %-98 %] 98 %  BP: (141-179)/(73-88) 146/88     Weight: 54.4 kg (120 lb)  Body mass index is 20.6 kg/m².    Physical Exam   Constitutional: She is oriented to person, place, and time. No distress.   Cachectic    HENT:   Head: Normocephalic and atraumatic.   Eyes: Conjunctivae and EOM are normal. Pupils are equal, round, and reactive to light.   Neck: Normal range of motion. Neck supple. No thyromegaly present.   Cardiovascular: Normal rate, regular rhythm, normal heart sounds and intact  distal pulses.    Pulmonary/Chest: Effort normal and breath sounds normal. No respiratory distress.   Abdominal: Soft. Bowel sounds are normal. She exhibits no distension. There is no tenderness.   Musculoskeletal: She exhibits tenderness (tenderness to LEFT lateral hip.). She exhibits no edema.   Unable to complete LEFT hip ROM 2/2 pain.  LEFT foot warm with good sensation, good strength with mobility.   Neurological: She is alert and oriented to person, place, and time. No cranial nerve deficit.   Skin: Skin is warm and dry. Capillary refill takes less than 2 seconds. No pallor.   Psychiatric:   Patient short, abrupt with answers.  Pessimistic outlook on situation and state of health.        Significant Labs:   CBC:   Recent Labs  Lab 05/30/17 2023   WBC 24.20*   HGB 13.3   HCT 39.5        CMP:   Recent Labs  Lab 05/30/17 2023      K 3.5      CO2 24      BUN 8   CREATININE 0.6   CALCIUM 9.1   PROT 6.8   ALBUMIN 3.7   BILITOT 0.4   ALKPHOS 69   AST 21   ALT 13   ANIONGAP 12   EGFRNONAA >60     Coagulation:   Recent Labs  Lab 05/31/17  0546   INR 1.0   APTT 28.9       Significant Imaging:   CXR: +hyperinflation, flat diaphragms.  No infiltrate or effusion.  EKG: SR 77 with flat T waves in inferiolateral leads.  CT Head: Negative.    Assessment/Plan:     * Closed intertrochanteric fracture of left femur    NPO.  Bedrest.  Requiring IV narcotics for pain control.  Ortho to see today.  Fall precautions. Pre-op CXR without abnormality, UA pending.           Alcohol dependence with intoxication    Maintain fall and seizure precautions. Continue IVF hydration- Banana bag x 1-- follow ETOH. Nursing to check CIWA-AR, monitoring closely for DTs and treat with PRN Benzodiazepines as needed. Use IV anti-emetics as needed.  Follow serum lytes. Discussed dangers of alcohol abuse particularly post-operatively following hip repair.  Consult  to provider cessation resources. Daily MVI,  thiamine, folic acid.                Fall on same level from tripping as cause of accidental injury    Resulting in hip fx.  Fall precautions in place.          Leukocytosis    Likely reactive to trauma.  Follow CBC closely, monitor for any s/sx infection.        Dependence on nicotine from cigarettes    Health hazards associated with cigarette smoking were reviewed with patient and cessation was encouraged. Nicotine replacement and counseling options were discussed.            Abnormal EKG    Non-specific T wave changes with no anginal symptoms, no SOB on exertion.  Discussed with Dr. iWnn.  No need for further cardiac workup pre-operatively.          VTE Risk Mitigation         Ordered     High Risk of VTE  Once      05/31/17 0105     Place sequential compression device  Until discontinued      05/31/17 0105     Place BRYANT hose  Until discontinued      05/31/17 0105     Reason for No Pharmacological VTE Prophylaxis  Anticoagulate post-operatively as per ortho recs.    05/31/17 0105        Margo Lang NP  Department of Hospital Medicine   [facility]

## 2017-05-31 NOTE — HPI
"Omari Diallo is a 77 y.o. With PMHx significant for alcohol abuse.  She was admitted to the service of hospital medicine with LEFT hip fracture.  She experienced a trip and fall while walking in her house.  She reports after falling she experienced LEFT hip pain and was unable to bear weight on the LEFT leg. She was drinking wine as she does every night since "the 80s".  She reports she recently cut back to 3 glasses of wine nightly.  She has been in ETOH rehab before.  She states she does get "shaky" when she doesn't drink.  She denies any seizure history.  She denies any headache, neck pain, back pain, abdominal pain, chest pain, or any other complaints.  She is complaining of left hip pain that is accompanied by muscle spasms. She reports the pain is improved with IV morphine and oral Robaxin. She denies any medical history.  She denies any previous cardiac workup.  She smokes 1/4 ppd for ~50 years.  "

## 2017-05-31 NOTE — PLAN OF CARE
Problem: Patient Care Overview  Goal: Plan of Care Review   at [facility].  Plan for procedure today with Dr Segovia.  Pain/spasms controled with PRN medication.  Bilateral lower extremities extremely dry and flaky.  Toes nails overgrown and thick.  Remained free from injury.  IV MVI infusing daily.  POC discussed.

## 2017-05-31 NOTE — PLAN OF CARE
Patient arrived from ER via stretcher. Patient yelling out with transfer to bed. Haynes to drainage. Call light at bedside. VS stable. 02 2L/NC in use. Medicated with Morphine 4mg IVP. Bed alarm in use.

## 2017-06-01 LAB
ANION GAP SERPL CALC-SCNC: 8 MMOL/L
BASOPHILS # BLD AUTO: 0 K/UL
BASOPHILS NFR BLD: 0.1 %
BUN SERPL-MCNC: 4 MG/DL
CALCIUM SERPL-MCNC: 8.5 MG/DL
CHLORIDE SERPL-SCNC: 101 MMOL/L
CO2 SERPL-SCNC: 27 MMOL/L
CREAT SERPL-MCNC: 0.6 MG/DL
DIFFERENTIAL METHOD: ABNORMAL
EOSINOPHIL # BLD AUTO: 0 K/UL
EOSINOPHIL NFR BLD: 0.2 %
ERYTHROCYTE [DISTWIDTH] IN BLOOD BY AUTOMATED COUNT: 13.2 %
EST. GFR  (AFRICAN AMERICAN): >60 ML/MIN/1.73 M^2
EST. GFR  (NON AFRICAN AMERICAN): >60 ML/MIN/1.73 M^2
GLUCOSE SERPL-MCNC: 126 MG/DL
HCT VFR BLD AUTO: 30.1 %
HGB BLD-MCNC: 10.1 G/DL
LYMPHOCYTES # BLD AUTO: 0.9 K/UL
LYMPHOCYTES NFR BLD: 6.4 %
MCH RBC QN AUTO: 33 PG
MCHC RBC AUTO-ENTMCNC: 33.5 %
MCV RBC AUTO: 99 FL
MONOCYTES # BLD AUTO: 1.1 K/UL
MONOCYTES NFR BLD: 7.3 %
NEUTROPHILS # BLD AUTO: 12.7 K/UL
NEUTROPHILS NFR BLD: 86 %
PLATELET # BLD AUTO: 244 K/UL
PMV BLD AUTO: 7.4 FL
POTASSIUM SERPL-SCNC: 3.5 MMOL/L
RBC # BLD AUTO: 3.06 M/UL
SODIUM SERPL-SCNC: 136 MMOL/L
WBC # BLD AUTO: 14.8 K/UL

## 2017-06-01 PROCEDURE — 36415 COLL VENOUS BLD VENIPUNCTURE: CPT

## 2017-06-01 PROCEDURE — 94761 N-INVAS EAR/PLS OXIMETRY MLT: CPT

## 2017-06-01 PROCEDURE — 25000003 PHARM REV CODE 250: Performed by: INTERNAL MEDICINE

## 2017-06-01 PROCEDURE — 85025 COMPLETE CBC W/AUTO DIFF WBC: CPT

## 2017-06-01 PROCEDURE — 63600175 PHARM REV CODE 636 W HCPCS: Performed by: ORTHOPAEDIC SURGERY

## 2017-06-01 PROCEDURE — 27000221 HC OXYGEN, UP TO 24 HOURS

## 2017-06-01 PROCEDURE — 99232 SBSQ HOSP IP/OBS MODERATE 35: CPT | Mod: ,,, | Performed by: INTERNAL MEDICINE

## 2017-06-01 PROCEDURE — 63600175 PHARM REV CODE 636 W HCPCS: Performed by: INTERNAL MEDICINE

## 2017-06-01 PROCEDURE — 97530 THERAPEUTIC ACTIVITIES: CPT

## 2017-06-01 PROCEDURE — 80048 BASIC METABOLIC PNL TOTAL CA: CPT

## 2017-06-01 PROCEDURE — 25000003 PHARM REV CODE 250: Performed by: NURSE PRACTITIONER

## 2017-06-01 PROCEDURE — 97116 GAIT TRAINING THERAPY: CPT

## 2017-06-01 PROCEDURE — 97161 PT EVAL LOW COMPLEX 20 MIN: CPT

## 2017-06-01 PROCEDURE — 11000001 HC ACUTE MED/SURG PRIVATE ROOM

## 2017-06-01 RX ADMIN — CEFAZOLIN SODIUM 2 G: 2 SOLUTION INTRAVENOUS at 04:06

## 2017-06-01 RX ADMIN — THIAMINE HYDROCHLORIDE: 100 INJECTION, SOLUTION INTRAMUSCULAR; INTRAVENOUS at 09:06

## 2017-06-01 RX ADMIN — ACETAMINOPHEN 1000 MG: 10 INJECTION, SOLUTION INTRAVENOUS at 02:06

## 2017-06-01 RX ADMIN — CEFAZOLIN SODIUM 2 G: 2 SOLUTION INTRAVENOUS at 12:06

## 2017-06-01 RX ADMIN — HYDROCODONE BITARTRATE AND ACETAMINOPHEN 1 TABLET: 5; 325 TABLET ORAL at 10:06

## 2017-06-01 RX ADMIN — ACETAMINOPHEN 1000 MG: 10 INJECTION, SOLUTION INTRAVENOUS at 06:06

## 2017-06-01 RX ADMIN — CEFTRIAXONE 1 G: 1 INJECTION, SOLUTION INTRAVENOUS at 11:06

## 2017-06-01 RX ADMIN — HYDROCODONE BITARTRATE AND ACETAMINOPHEN 1 TABLET: 5; 325 TABLET ORAL at 04:06

## 2017-06-01 RX ADMIN — PANTOPRAZOLE SODIUM 40 MG: 40 TABLET, DELAYED RELEASE ORAL at 08:06

## 2017-06-01 RX ADMIN — ENOXAPARIN SODIUM 40 MG: 100 INJECTION SUBCUTANEOUS at 08:06

## 2017-06-01 RX ADMIN — SODIUM CHLORIDE: 0.9 INJECTION, SOLUTION INTRAVENOUS at 04:06

## 2017-06-01 NOTE — PLAN OF CARE
Called Dr Martin  Vital signs stable   Pain level 5/10 per pt while resting  Pt is released from pacu

## 2017-06-01 NOTE — PROGRESS NOTES
"Patient refused physical therapy, stated " I don't know what the hurry is", patient educated on the reasons for early ambulation and the risks involved without PT.  "

## 2017-06-01 NOTE — NURSING TRANSFER
Nursing Transfer Note      5/31/2017     Transfer To: 310    Transfer via bed    Transfer with 2 lpm nc to O2    Transported by Jessica  RN/Miri RN    Medicines sent: NA    Chart send with patient: Yes    Notified: spouse at the bedside    Patient reassessed at:5/31/2017   1035    Upon arrival to floor: cardiac monitor applied, patient oriented to room, call bell in reach and bed in lowest position    Report to Mary Lou MUSE  Pt dressings are dry/the upper dressing tape area opened, so it was reinforced with a metapore dressing that was available to seal the incision area.  The patient was able to roll side to side and a noted blood area was on the sheet.  Pad was placed, no hematoma felt/area soft.  SCD's on, Vital; signs stable

## 2017-06-01 NOTE — PT/OT/SLP PROGRESS
Occupational Therapy      Omari Diallo  MRN: [id number]    Patient not seen today secondary to patient with physical therapy  . Will follow-up tomorrow, 6/2/17.    Tori Petersen OT  6/1/2017

## 2017-06-01 NOTE — PROGRESS NOTES
"Dr. Segovia at the bedside seeing pt. Surgical extremity marked with a "yes" per Dr. Segovia. Updated pt's pre op nurse, Dolores.   "

## 2017-06-01 NOTE — PROGRESS NOTES
"Progress Note  Hospital Medicine  Patient Name:Omari Diallo  MRN:  [id number]  Patient Class: IP- Inpatient  Admit Date: 5/30/2017  Length of Stay: 2 days  Expected Discharge Date:   Attending Physician: New Winn MD  Primary Care Provider:  Juan J Aldrich MD    SUBJECTIVE:     Principal Problem: Closed intertrochanteric fracture of left femur  Initial history of present illness: Omari Diallo is a 77 y.o. With PMHx significant for alcohol abuse.  She was admitted to the service of hospital medicine with LEFT hip fracture.  She experienced a trip and fall while walking in her house.   She reports after falling she experienced LEFT hip pain and was unable to bear weight on the LEFT leg. She was drinking wine as she does every night since "the 80s".  She reports she recently cut back to 3 glasses of wine nightly.  She has been in ETOH rehab before.  She states she does get "shaky" when she doesn't drink.  She denies any seizure history.  She denies any headache, neck pain, back pain, abdominal pain, chest pain, or any other complaints.  She is complaining of left hip pain that is accompanied by muscle spasms. She reports the pain is improved with IV morphine and oral Robaxin. She denies any medical history.  She denies any previous cardiac workup.  She smokes 1/4 ppd for ~50 years.    PMH/PSH/SH/FH/Meds: reviewed.    Symptoms/Review of Systems: Doing fine after hip surgery. No shortness of breath, cough, chest pain or headache, fever or abdominal pain.     Diet:  Adequate intake.    Activity level: Normal.    Pain:  stable    OBJECTIVE:   Vital Signs (Most Recent):      Temp: 99.5 °F (37.5 °C) (06/01/17 0314)  Pulse: 78 (06/01/17 0835)  Resp: 16 (06/01/17 0835)  BP: 118/64 (06/01/17 0314)  SpO2: 96 % (06/01/17 0835)       Vital Signs Range (Last 24H):  Temp:  [97.2 °F (36.2 °C)-99.5 °F (37.5 °C)]   Pulse:  []   Resp:  [14-24]   BP: (118-177)/(64-99)   SpO2:  [90 %-100 %]     Weight: 54.5 kg (120 lb 2.4 oz)  Body " mass index is 20.62 kg/m².    Intake/Output Summary (Last 24 hours) at 06/01/17 1053  Last data filed at 06/01/17 0609   Gross per 24 hour   Intake          1024.17 ml   Output             1850 ml   Net          -825.83 ml     Physical Examination:  Constitutional: She is oriented to person, place, and time. No distress.   Cachectic    HENT:   Head: Normocephalic and atraumatic.   Eyes: Conjunctivae and EOM are normal. Pupils are equal, round, and reactive to light.   Neck: Normal range of motion. Neck supple. No thyromegaly present.   Cardiovascular: Normal rate, regular rhythm, normal heart sounds and intact distal pulses.    Pulmonary/Chest: Effort normal and breath sounds normal. No respiratory distress.   Abdominal: Soft. Bowel sounds are normal. She exhibits no distension. There is no tenderness.   Musculoskeletal: Left hip dressing C/D/I.  Neurological: She is alert and oriented to person, place, and time. No cranial nerve deficit.   Skin: Skin is warm and dry. Capillary refill takes less than 2 seconds. No pallor.     CBC:    Recent Labs  Lab 05/30/17 2023 05/31/17 0546 06/01/17  0539   WBC 24.20* 17.80* 14.80*   RBC 4.06 3.99* 3.06*   HGB 13.3 13.1 10.1*   HCT 39.5 39.2 30.1*    315 244   MCV 97 98 99*   MCH 32.8* 32.8* 33.0*   MCHC 33.7 33.4 33.5   BMP    Recent Labs  Lab 05/30/17 2023 06/01/17  0539    126*    136   K 3.5 3.5    101   CO2 24 27   BUN 8 4*   CREATININE 0.6 0.6   CALCIUM 9.1 8.5*      Diagnostic Results:  Microbiology Results (last 7 days)     Procedure Component Value Units Date/Time    Urine culture [[id number]] Collected:  05/31/17 0944    Order Status:  Sent Specimen:  Urine from Urine, Catheterized Updated:  05/31/17 1446       Left femur x-ray: Status post ORIF intertrochanteric fracture left femur with a long intramedullary nail and pin through the nail into the femoral head with excellent apposition and angulation.    X-ray pelvis: Intertrochanteric  fracture left femur.  Diffuse osteoporosis.  Degenerative disc disease at L4-5.    Left femur fracture: Difficult to visualize slightly angulated intertrochanteric fracture of the left hip.    CXR: Atherosclerosis otherwise negative chest.    Assessment/Plan:     * Closed intertrochanteric fracture of left femur s/p left intra-medullary hip nail placement     Continue to follow Orthopedic recommendations.  Needs aggressive incentive spirometry.  Follow hemoglobin and hematocrit closely.  Pain control with PO narcotics and antiemetics as needed.  Physical therapy as per Orthopedics protocol with fall precautions.          Alcohol dependence with intoxication     Maintain fall and seizure precautions. Continue IVF hydration- Banana bag x 1-- follow ETOH. Nursing to check CIWA-AR, monitoring closely for DTs and treat with PRN Benzodiazepines as needed. Use IV anti-emetics as needed.  Follow serum lytes. Discussed dangers of alcohol abuse particularly post-operatively following hip repair.  Consult  to provider cessation resources. Daily MVI, thiamine, folic acid.          Fall on same level from tripping as cause of accidental injury     Resulting in hip fx.  Fall precautions in place.          Leukocytosis - improving     Likely reactive to trauma.  Follow CBC closely, monitor for any s/sx infection.       Dependence on nicotine from cigarettes     Health hazards associated with cigarette smoking were reviewed with patient and cessation was encouraged. Nicotine replacement and counseling options were discussed.            VTE Risk Mitigation         Ordered     enoxaparin injection 40 mg  Every 24 hours (non-standard times)     Route:  Subcutaneous        05/31/17 2111     Medium Risk of VTE  Once      05/31/17 2224     Place BRYANT hose  Until discontinued      05/31/17 2224     Place sequential compression device  Until discontinued      05/31/17 2224     Place sequential compression device  Until  discontinued      05/31/17 0105     Place BRYANT hose  Until discontinued      05/31/17 0105     Reason for No Pharmacological VTE Prophylaxis  Once      05/31/17 0105        New Winn MD  Department of Hospital Medicine   [facility]

## 2017-06-01 NOTE — PLAN OF CARE
Problem: Patient Care Overview  Goal: Individualization & Mutuality  Outcome: Ongoing (interventions implemented as appropriate)  No acute events throughout the shift. Pt arrived from pacu at ~2200. Pt complains of pain that is moderately relieved by PRN medications throughout the nightshift. Pt requesting to have Haynes catheter D/Justice after working with PT. Will advise oncoming nurse of plan. Pt is able to reposition as needed and address concerns. Pt slept most of the night shift. VS and assessment performed per orders, VSS. Pt denies numbness and tingling and has palpable pulses in both lower extremities. Patient progressing towards goals as tolerated. Plan of care reviewed with pt, all concerns addressed. Will continue to monitor

## 2017-06-01 NOTE — ANESTHESIA PREPROCEDURE EVALUATION
05/31/2017  Omari Diallo is a 77 y.o., female.    Anesthesia Evaluation    I have reviewed the Patient Summary Reports.    I have reviewed the Nursing Notes.      Review of Systems  Anesthesia Hx:  No problems with previous Anesthesia        Physical Exam  General:  Well nourished                 Anesthesia Plan  Type of Anesthesia, risks & benefits discussed:  Anesthesia Type:  general  Patient's Preference:   Intra-op Monitoring Plan:   Intra-op Monitoring Plan Comments:   Post Op Pain Control Plan:   Post Op Pain Control Plan Comments:   Induction:   IV  Beta Blocker:  Patient is not currently on a Beta-Blocker (No further documentation required).       Informed Consent: Patient understands risks and agrees with Anesthesia plan.  Questions answered. Anesthesia consent signed with patient.  ASA Score: 2     Day of Surgery Review of History & Physical:    H&P update referred to the surgeon.         Ready For Surgery From Anesthesia Perspective.

## 2017-06-01 NOTE — TRANSFER OF CARE
"Anesthesia Transfer of Care Note    Patient: Omari Diallo    Procedure(s) Performed: Procedure(s) (LRB):  [id number], Synthes, fracture table (Left)    Patient location: PACU    Anesthesia Type: general    Transport from OR: Transported from OR on 2-3 L/min O2 by NC with adequate spontaneous ventilation    Post pain: adequate analgesia    Post assessment: no apparent anesthetic complications and tolerated procedure well    Post vital signs: stable    Level of consciousness: awake    Nausea/Vomiting: no nausea/vomiting    Complications: none    Transfer of care protocol was followed      Last vitals:   Visit Vitals  BP (!) 147/75 (BP Location: Right arm, Patient Position: Lying, BP Method: Automatic)   Pulse 88   Temp 36.9 °C (98.4 °F) (Oral)   Resp 18   Ht 5' 4" (1.626 m)   Wt 54.4 kg (120 lb)   SpO2 (!) 93%   Breastfeeding? No   BMI 20.60 kg/m²     "

## 2017-06-01 NOTE — PLAN OF CARE
Pt transfer to room 310 released from pacu per Dr Martin  Vital signs are stable  SPO2 99 % on room air  Pain level 5/10  Pt is drowsy   at her side

## 2017-06-01 NOTE — PROGRESS NOTES
Patient still refusing to have josue removed at this time, states she would like it removed tomorrow after her  brings ortho shoes to her, patient educated on infection risks and patient states understanding the risk

## 2017-06-01 NOTE — PT/OT/SLP EVAL
Physical Therapy  Evaluation    Omari Diallo   MRN: [id number]   Admitting Diagnosis: Closed intertrochanteric fracture of left femur    PT Received On: 06/01/17  PT Start Time: 0935     PT Stop Time: 0955    PT Total Time (min): 20 min       Billable Minutes:  Evaluation 20    Diagnosis: Closed intertrochanteric fracture of left femur      History reviewed. No pertinent past medical history.   Past Surgical History:   Procedure Laterality Date    APPENDECTOMY         Referring physician: Luca  Date referred to PT: 6/01/2017    General Precautions: Standard, fall  Orthopedic Precautions: LLE weight bearing as tolerated   Braces:              Patient History:  Lives With: spouse  Living Arrangements: house  Home Layout: Able to live on 1st floor     Previous Level of Function:  Ambulation Skills: needs device  Transfer Skills: needs device  ADL Skills: needs device    Subjective:  Communicated with RN prior to session.     Pain/Comfort  Pain Rating 1: 7/10  Location - Side 1: Left  Location 1: hip  Pain Addressed 1: Reposition, Nurse notified, Distraction  Pain Rating Post-Intervention 1: 7/10      Objective:   Patient found with: SCD, oxygen, josue catheter, bed alarm, peripheral IV     Lower Extremity Range of Motion:  Right Lower Extremity: WFL  Left Lower Extremity: hip very limited by pain and weakness    Lower Extremity Strength:  Right Lower Extremity: > 3/5  Left Lower Extremity: hip 2-/5     Functional Mobility:  Bed Mobility:  Rolling/Turning to Left: With trapeze, Minimum assistance, With side rail  Supine to Sit: Moderate Assistance, With side rail, With trapeze    Transfers:  Sit <> Stand Assistance: Minimum Assistance  Sit <> Stand Assistive Device: Rolling Walker    Gait:   Gait Distance: 15'  Assistance 1: Minimum assistance  Gait Assistive Device: Rolling walker    Therapeutic Activities and Exercises:  SUPINE: ankle DF/PF, TKE, x 10 reps each  P.M. Session: (3:28 - 3:55): transfer training:  supine-sit mod assist and cues; sit-stand from mod ht bed; min assist and cues; gait training: 15' with RW with min assist and cues; lateral scooting in sit and supine with trapeze; ankle DF x 10 reps each. SCDs replace, O2 to wall 2L/min via nc.call button in reach.lines intact.RN aware.    AM-PAC 6 CLICK MOBILITY  How much help from another person does this patient currently need?   1 = Unable, Total/Dependent Assistance  2 = A lot, Maximum/Moderate Assistance  3 = A little, Minimum/Contact Guard/Supervision  4 = None, Modified [place]/Independent    Turning over in bed (including adjusting bedclothes, sheets and blankets)?: 2  Sitting down on and standing up from a chair with arms (e.g., wheelchair, bedside commode, etc.): 2  Moving from lying on back to sitting on the side of the bed?: 2  Moving to and from a bed to a chair (including a wheelchair)?: 2  Need to walk in hospital room?: 3  Climbing 3-5 steps with a railing?: 1  Total Score: 12     AM-PAC Raw Score CMS G-Code Modifier Level of Impairment Assistance   6 % Total / Unable   7 - 9 CM 80 - 100% Maximal Assist   10 - 14 CL 60 - 80% Moderate Assist   15 - 19 CK 40 - 60% Moderate Assist   20 - 22 CJ 20 - 40% Minimal Assist   23 CI 1-20% SBA / CGA   24 CH 0% Independent/ Mod I     Patient left up in chair with call button in reach and RN present.    Assessment:   Omari Diallo is a 77 y.o. female with a medical diagnosis of Closed intertrochanteric fracture of left femur and presents with very limited functional mobility, pain, and weakness .    Rehab identified problem list/impairments: Rehab identified problem list/impairments: weakness, impaired functional mobilty, pain, gait instability, impaired endurance, impaired balance, decreased ROM, impaired self care skills    Rehab potential is fair.    Activity tolerance: Fair    Equipment recommendations: Equipment Needed After Discharge:  (TBD)     GOALS:    Physical Therapy Goals        Problem:  Physical Therapy Goal    Goal Priority Disciplines Outcome Goal Variances Interventions   Physical Therapy Goal     PT/OT, PT      Description:  Goals to be met by: 2017     Patient will increase functional independence with mobility by performin). Supine to sit with Contact Guard Assistance  2). Sit to supine with Contact Guard Assistance  3). Sit to stand transfer with Stand-by Assistance  4). Gait  x > 50 feet with Stand-by Assistance using Rolling Walker.                       PLAN:    Patient to be seen BID to address the above listed problems via gait training, therapeutic activities, therapeutic exercises  Plan of Care expires: 17  Plan of Care reviewed with: patient          Leonard T Daniella, PT  2017

## 2017-06-01 NOTE — OP NOTE
Orthopaedic Surgery Operative Report      DATE OF PROCEDURE: 05/31/2017   PREOPERATIVE DIAGNOSIS: Closed intertrochanteric fracture of left femur, initial encounter [S72.768A]  POSTOPERATIVE DIAGNOSIS: Same.  PROCEDURE PERFORMED: Intramedullary nailing, left intertrochanteric femur fracture.  SURGEON: Surgeon(s) and Role:     * Mau Segovia MD - Primary   ESTIMATED BLOOD LOSS: 50 cc.  ANESTHESIA: General endotracheal.  IMPLANTS: Synthes trochanteric fixation nail [id number] size with 95 mm helical blade.    INDICATIONS: The patient is an 77 y.o. female who had a low energy fall sustaining a left femur fracture. The patient presented to the Emergency Department and was evaluated by Orthopaedics and Internal Medicine. This procedure, as well as, alternatives to this procedure was discussed at length with the patient. Risks and benefits were also discussed. Risks include but are not limited to bleeding, infection, numbness, scarring, damage to major neurovascular structures, limb length/rotation discrepancy, failure of hardware, need for further surgery, loss of function, myocardial infarction, deep venous thrombosis, pulmonary embolism, nonunion, malunion and death. Patient understood these well and consented for the procedure as described..    DESCRIPTION OF PROCEDURE: The patient was identified in the preoperative holding area and site was marked. The patient was wheeled into the Operating Room and general endotracheal anesthesia was induced in the patient's hospital bed.  A timeout was taken to confirm the patient, site, surgery, surgeon and administration of preoperative antibiotics. All agreed and we proceeded. The patient was placed in fracture boots, transferred to the fracture table and manipulated under fluoroscopic control until we could obtain near anatomic alignment. The operative site was prepped and draped in the usual sterile fashion. A 3 cm longitudinal incision was made over the lateral  aspect of the hip just proximal and posterior to the greater trochanter. The gluteal fascia was incised and a hematoma was evacuated. A threaded guide pin was placed on the tip of the greater trochanter and entered into the proximal femur. This was checked under AP and lateral fluoroscopic views. A drill was used to breach the cortex and a guide pin was placed down the femoral canal distally to the physeal scar of the distal femur. A reamer was then used in a increasing incremental fashion to ream the femoral canal.  A 380mm nail was then placed without difficulty. The interlocking guide was then placed, and a 3 cm longitudinal incision was made over the lateral aspect of the thigh.  Subcutaneous tissue and fascia anthony were incised.  The guide pin was then placed in the interlocking hole for the proximal helical blade, and the guide pin was placed into the center/center position of the femoral neck and head as verified on AP and lateral fluoroscopic views.  This was measured at 95mm. The step drill was used to ream the lateral cortex of the femur, and the helical blade was placed. Attention was then given to the distal nail, and using the perfect circles technique, a drill bit was placed through the intramedullary nail and bilateral cortices.  A proper size and fit of the distal screw was also noted and placed. On fluoroscopic control, placement was confirmed in AP and lateral fluoroscopic views. A near anatomical alignment of the fracture site was completed and all hardware was properly fixed. All wounds were thoroughly irrigated and hemostasis confirmed. The fascial layers were then reapproximated using #1 Vicryl suture in a figure-of-eight fashion. The subcutaneous tissues were reapproximated in layers using 3-0 Vicryl sutures, and the skin was reapproximated with 3-0 Nylon in a horizontal mattress suture fashion. Sterile dressings were applied. All instrument and sponge counts were reported correct at the end of  the case. There were no complications. The patient was extubated, awakened and taken to the post anesthesia care unit in stable condition.     PLAN FOR THE PATIENT: Early mobilization of the operative limb.      Mau Segovia M.D.   [facility]

## 2017-06-02 LAB
ANION GAP SERPL CALC-SCNC: 8 MMOL/L
BASOPHILS # BLD AUTO: 0 K/UL
BASOPHILS NFR BLD: 0 %
BUN SERPL-MCNC: 3 MG/DL
CALCIUM SERPL-MCNC: 8.8 MG/DL
CHLORIDE SERPL-SCNC: 103 MMOL/L
CO2 SERPL-SCNC: 28 MMOL/L
CREAT SERPL-MCNC: 0.5 MG/DL
DIFFERENTIAL METHOD: ABNORMAL
EOSINOPHIL # BLD AUTO: 0.1 K/UL
EOSINOPHIL NFR BLD: 0.4 %
ERYTHROCYTE [DISTWIDTH] IN BLOOD BY AUTOMATED COUNT: 13.6 %
EST. GFR  (AFRICAN AMERICAN): >60 ML/MIN/1.73 M^2
EST. GFR  (NON AFRICAN AMERICAN): >60 ML/MIN/1.73 M^2
GLUCOSE SERPL-MCNC: 101 MG/DL
HCT VFR BLD AUTO: 28.5 %
HGB BLD-MCNC: 9.7 G/DL
LYMPHOCYTES # BLD AUTO: 1.3 K/UL
LYMPHOCYTES NFR BLD: 8.9 %
MCH RBC QN AUTO: 33.4 PG
MCHC RBC AUTO-ENTMCNC: 34.1 %
MCV RBC AUTO: 98 FL
MONOCYTES # BLD AUTO: 1.4 K/UL
MONOCYTES NFR BLD: 9.7 %
NEUTROPHILS # BLD AUTO: 11.4 K/UL
NEUTROPHILS NFR BLD: 81 %
PLATELET # BLD AUTO: 261 K/UL
PMV BLD AUTO: 7.3 FL
POTASSIUM SERPL-SCNC: 2.8 MMOL/L
RBC # BLD AUTO: 2.91 M/UL
SODIUM SERPL-SCNC: 139 MMOL/L
WBC # BLD AUTO: 14.1 K/UL

## 2017-06-02 PROCEDURE — 97116 GAIT TRAINING THERAPY: CPT

## 2017-06-02 PROCEDURE — 99232 SBSQ HOSP IP/OBS MODERATE 35: CPT | Mod: ,,, | Performed by: INTERNAL MEDICINE

## 2017-06-02 PROCEDURE — 97530 THERAPEUTIC ACTIVITIES: CPT

## 2017-06-02 PROCEDURE — 11000001 HC ACUTE MED/SURG PRIVATE ROOM

## 2017-06-02 PROCEDURE — 80048 BASIC METABOLIC PNL TOTAL CA: CPT

## 2017-06-02 PROCEDURE — 97110 THERAPEUTIC EXERCISES: CPT

## 2017-06-02 PROCEDURE — 85025 COMPLETE CBC W/AUTO DIFF WBC: CPT

## 2017-06-02 PROCEDURE — 25000003 PHARM REV CODE 250: Performed by: INTERNAL MEDICINE

## 2017-06-02 PROCEDURE — 63600175 PHARM REV CODE 636 W HCPCS: Performed by: INTERNAL MEDICINE

## 2017-06-02 PROCEDURE — 63600175 PHARM REV CODE 636 W HCPCS: Performed by: ORTHOPAEDIC SURGERY

## 2017-06-02 PROCEDURE — 36415 COLL VENOUS BLD VENIPUNCTURE: CPT

## 2017-06-02 PROCEDURE — 97165 OT EVAL LOW COMPLEX 30 MIN: CPT

## 2017-06-02 PROCEDURE — 27000221 HC OXYGEN, UP TO 24 HOURS

## 2017-06-02 PROCEDURE — 25000003 PHARM REV CODE 250: Performed by: NURSE PRACTITIONER

## 2017-06-02 PROCEDURE — 94761 N-INVAS EAR/PLS OXIMETRY MLT: CPT

## 2017-06-02 RX ORDER — POTASSIUM CHLORIDE 20 MEQ/1
60 TABLET, EXTENDED RELEASE ORAL ONCE
Status: COMPLETED | OUTPATIENT
Start: 2017-06-02 | End: 2017-06-02

## 2017-06-02 RX ADMIN — ENOXAPARIN SODIUM 40 MG: 100 INJECTION SUBCUTANEOUS at 08:06

## 2017-06-02 RX ADMIN — PANTOPRAZOLE SODIUM 40 MG: 40 TABLET, DELAYED RELEASE ORAL at 08:06

## 2017-06-02 RX ADMIN — CEFTRIAXONE 1 G: 1 INJECTION, SOLUTION INTRAVENOUS at 10:06

## 2017-06-02 RX ADMIN — POTASSIUM CHLORIDE 60 MEQ: 20 TABLET, EXTENDED RELEASE ORAL at 10:06

## 2017-06-02 RX ADMIN — THIAMINE HYDROCHLORIDE: 100 INJECTION, SOLUTION INTRAMUSCULAR; INTRAVENOUS at 10:06

## 2017-06-02 RX ADMIN — HYDROCODONE BITARTRATE AND ACETAMINOPHEN 1 TABLET: 5; 325 TABLET ORAL at 09:06

## 2017-06-02 NOTE — PROGRESS NOTES
I provided outpatient Alcohol treatment resources to the pt . She was receptive to the information given and thanked me. Tracy Hernandez LMSW

## 2017-06-02 NOTE — PLAN OF CARE
Problem: Occupational Therapy Goal  Goal: Occupational Therapy Goal  Goals to be met by: 6-9-17     Patient will increase functional independence with ADLs by performing:    LE Dressing with Minimal Assistance.  Toileting from bedside commode over toilet with Minimal Assistance for hygiene and clothing management.   Stand pivot transfers with Minimal Assistance.  Toilet transfer to bedside commode over toilet with Minimal Assistance.    Outcome: Ongoing (interventions implemented as appropriate)  OT Evaluation completed and poc established and in EPIC.  ZELALEM Evans

## 2017-06-02 NOTE — PT/OT/SLP PROGRESS
Physical Therapy  Treatment    Omari Diallo   MRN: [id number]   Admitting Diagnosis: Closed intertrochanteric fracture of left femur    PT Received On: 06/02/17  PT Start Time: 0900     PT Stop Time: 0916    PT Total Time (min): 16 min       Billable Minutes:  Gait [id number] and Therapeutic Exercise 8min    Treatment Type: Treatment  PT/PTA: PTA     PTA Visit Number: 1       General Precautions: Standard, fall  Orthopedic Precautions: LLE weight bearing as tolerated   Braces:           Subjective:  Communicated with nurse Carrasco prior to session.  Agreed to mobilize.     Pain/Comfort  Pain Rating 1: other (see comments) (did not rate. Reports pain with movement only.)  Location - Side 1: Left  Location - Orientation 1: generalized  Location 1: hip  Pain Addressed 1: Reposition, Nurse notified    Objective:   Patient found with: SCD, josue catheter, bed alarm, peripheral IV    Functional Mobility:  Bed Mobility:   Rolling/Turning Right: Minimum assistance  Scooting/Bridging: Contact Guard Assistance  Supine to Sit: Moderate Assistance, With side rail    Transfers:  Sit <> Stand Assistance: Minimum Assistance  Sit <> Stand Assistive Device: Rolling Walker    Gait:   Gait Distance: 6'  Assistance 1: Minimum assistance  Gait Assistive Device: Rolling walker  Gait Pattern: 3-point gait  Gait Deviation(s): decreased otoniel, decreased velocity of limb motion, decreased step length, decreased weight-shifting ability    Stairs:      Balance:   Static Sit: FAIR-: Maintains without assist but inconsistent   Dynamic Sit: FAIR+: Maintains balance through MINIMAL excursions of active trunk motion  Static Stand: FAIR: Maintains without assist but unable to take challenges  Dynamic stand: POOR: N/A     Therapeutic Activities and Exercises:  Transferred to sitting EOB with A. Ambulated 6 steps with rw and Mod A very slowly with difficulty WB'ing LLE. Sat up in chair , performed LE exercises at 10 reps each ( AAROM LLE): HS, QS, AP's,  SLR's, Hip abd / add (RLE only).      AM-PAC 6 CLICK MOBILITY  How much help from another person does this patient currently need?   1 = Unable, Total/Dependent Assistance  2 = A lot, Maximum/Moderate Assistance  3 = A little, Minimum/Contact Guard/Supervision  4 = None, Modified [place]/Independent         AM-PAC Raw Score CMS G-Code Modifier Level of Impairment Assistance   6 % Total / Unable   7 - 9 CM 80 - 100% Maximal Assist   10 - 14 CL 60 - 80% Moderate Assist   15 - 19 CK 40 - 60% Moderate Assist   20 - 22 CJ 20 - 40% Minimal Assist   23 CI 1-20% SBA / CGA   24 CH 0% Independent/ Mod I     Patient left up in chair with all lines intact, call button in reach and nurse Danilo notified.    Assessment:  Omari Diallo is a 77 y.o. female with a medical diagnosis of Closed intertrochanteric fracture of left femur and presents with weakness, pain with movement, limited endurance.    Rehab identified problem list/impairments: Rehab identified problem list/impairments: weakness, impaired functional mobilty, pain, gait instability, impaired endurance, impaired balance, decreased ROM, impaired self care skills    Rehab potential is good.    Activity tolerance: Fair    Discharge recommendations:       Barriers to discharge:      Equipment recommendations:       GOALS:    Physical Therapy Goals        Problem: Physical Therapy Goal    Goal Priority Disciplines Outcome Goal Variances Interventions   Physical Therapy Goal     PT/OT, PT Ongoing (interventions implemented as appropriate)     Description:  Goals to be met by: 2017     Patient will increase functional independence with mobility by performin). Supine to sit with Contact Guard Assistance  2). Sit to supine with Contact Guard Assistance  3). Sit to stand transfer with Stand-by Assistance  4). Gait  x > 50 feet with Stand-by Assistance using Rolling Walker.                       PLAN:    Patient to be seen BID (daily  Sat and Sun)  to  address the above listed problems via gait training, therapeutic activities, therapeutic exercises  Plan of Care expires: 06/08/17  Plan of Care reviewed with: patient         Kalyani Alber, PTA  06/02/2017

## 2017-06-02 NOTE — PROGRESS NOTES
"Progress Note  Hospital Medicine  Patient Name:Omari Diallo  MRN:  [id number]  Patient Class: IP- Inpatient  Admit Date: 5/30/2017  Length of Stay: 3 days  Expected Discharge Date:   Attending Physician: New Winn MD  Primary Care Provider:  Juan J Aldrich MD    SUBJECTIVE:     Principal Problem: Closed intertrochanteric fracture of left femur  Initial history of present illness: Omari Diallo is a 77 y.o. With PMHx significant for alcohol abuse.  She was admitted to the service of hospital medicine with LEFT hip fracture.  She experienced a trip and fall while walking in her house.   She reports after falling she experienced LEFT hip pain and was unable to bear weight on the LEFT leg. She was drinking wine as she does every night since "the 80s".  She reports she recently cut back to 3 glasses of wine nightly.  She has been in ETOH rehab before.  She states she does get "shaky" when she doesn't drink.  She denies any seizure history.  She denies any headache, neck pain, back pain, abdominal pain, chest pain, or any other complaints.  She is complaining of left hip pain that is accompanied by muscle spasms. She reports the pain is improved with IV morphine and oral Robaxin. She denies any medical history.  She denies any previous cardiac workup.  She smokes 1/4 ppd for ~50 years.    PMH/PSH/SH/FH/Meds: reviewed.    Symptoms/Review of Systems: Doing fine after hip surgery. No shortness of breath, cough, chest pain or headache, fever or abdominal pain.     Diet:  Adequate intake.    Activity level: Normal.    Pain:  stable    OBJECTIVE:   Vital Signs (Most Recent):      Temp: 98.4 °F (36.9 °C) (06/02/17 0500)  Pulse: 91 (06/02/17 0500)  Resp: 20 (06/02/17 0500)  BP: 133/60 (06/02/17 0500)  SpO2: 97 % (06/02/17 0500)       Vital Signs Range (Last 24H):  Temp:  [97.7 °F (36.5 °C)-99.2 °F (37.3 °C)]   Pulse:  [78-97]   Resp:  [17-20]   BP: (103-133)/(57-61)   SpO2:  [90 %-97 %]     Weight: 54.5 kg (120 lb 2.4 oz)  Body mass " index is 20.62 kg/m².    Intake/Output Summary (Last 24 hours) at 06/02/17 0846  Last data filed at 06/02/17 0700   Gross per 24 hour   Intake             2410 ml   Output             1550 ml   Net              860 ml     Physical Examination:  Constitutional: She is oriented to person, place, and time. No distress.   Cachectic    HENT:   Head: Normocephalic and atraumatic.   Eyes: Conjunctivae and EOM are normal. Pupils are equal, round, and reactive to light.   Neck: Normal range of motion. Neck supple. No thyromegaly present.   Cardiovascular: Normal rate, regular rhythm, normal heart sounds and intact distal pulses.    Pulmonary/Chest: Effort normal and breath sounds normal. No respiratory distress.   Abdominal: Soft. Bowel sounds are normal. She exhibits no distension. There is no tenderness.   Musculoskeletal: Left hip dressing C/D/I.  Neurological: She is alert and oriented to person, place, and time. No cranial nerve deficit.   Skin: Skin is warm and dry. Capillary refill takes less than 2 seconds. No pallor.     CBC:    Recent Labs  Lab 05/31/17  0546 06/01/17  0539 06/02/17  0535   WBC 17.80* 14.80* 14.10*   RBC 3.99* 3.06* 2.91*   HGB 13.1 10.1* 9.7*   HCT 39.2 30.1* 28.5*    244 261   MCV 98 99* 98   MCH 32.8* 33.0* 33.4*   MCHC 33.4 33.5 34.1   BMP    Recent Labs  Lab 05/30/17 2023 06/01/17  0539 06/02/17  0535    126* 101    136 139   K 3.5 3.5 2.8*    101 103   CO2 24 27 28   BUN 8 4* 3*   CREATININE 0.6 0.6 0.5   CALCIUM 9.1 8.5* 8.8      Diagnostic Results:  Microbiology Results (last 7 days)     Procedure Component Value Units Date/Time    Urine culture [[id number]] Collected:  05/31/17 0944    Order Status:  Completed Specimen:  Urine from Urine, Catheterized Updated:  06/01/17 1338     Urine Culture, Routine --     PRESUMPTIVE E COLI  >100,000 cfu/ml  Identification and susceptibility pending         Left femur x-ray: Status post ORIF intertrochanteric fracture left  femur with a long intramedullary nail and pin through the nail into the femoral head with excellent apposition and angulation.    X-ray pelvis: Intertrochanteric fracture left femur.  Diffuse osteoporosis.  Degenerative disc disease at L4-5.    Left femur fracture: Difficult to visualize slightly angulated intertrochanteric fracture of the left hip.    CXR: Atherosclerosis otherwise negative chest.    Assessment/Plan:     * Closed intertrochanteric fracture of left femur s/p left intra-medullary hip nail placement     Continue to follow Orthopedic recommendations.  Needs aggressive incentive spirometry.  Follow hemoglobin and hematocrit closely.  Pain control with PO narcotics and antiemetics as needed.  Physical therapy as per Orthopedics protocol with fall precautions.          Alcohol dependence with intoxication     Maintain fall and seizure precautions. Continue IVF hydration- Banana bag x 1-- follow ETOH. Nursing to check CIWA-AR, monitoring closely for DTs and treat with PRN Benzodiazepines as needed. Use IV anti-emetics as needed.  Follow serum lytes. Discussed dangers of alcohol abuse particularly post-operatively following hip repair.  Daily MVI, thiamine, folic acid.      Hypokalemia  Replete KCl. Follow BMP.     Fall on same level from tripping as cause of accidental injury     Resulting in hip fx.  Fall precautions in place.          UTI- E. Coli sp     Follow urine culture.  Continue IV Ceftriaxone.       Dependence on nicotine from cigarettes     Health hazards associated with cigarette smoking were reviewed with patient and cessation was encouraged. Nicotine replacement and counseling options were discussed.       Discussed with patient, if patient does better with PT tomorrow; DC home with home PT otherwise SNF appropriate.     VTE Risk Mitigation         Ordered     enoxaparin injection 40 mg  Every 24 hours (non-standard times)     Route:  Subcutaneous        05/31/17 2111     Medium Risk of VTE   Once      05/31/17 2224     Place BRYANT hose  Until discontinued      05/31/17 2224     Place sequential compression device  Until discontinued      05/31/17 2224     Place sequential compression device  Until discontinued      05/31/17 0105     Place BRYANT hose  Until discontinued      05/31/17 0105     Reason for No Pharmacological VTE Prophylaxis  Once      05/31/17 0105        New Winn MD  Department of Hospital Medicine   [facility]

## 2017-06-02 NOTE — PLAN OF CARE
Problem: Patient Care Overview  Goal: Discharge Needs Assessment  Outcome: Ongoing (interventions implemented as appropriate)  No acute events throughout the shift. VS and assessment performed per orders, VSS. Pt transitioned from 2L NC to room air with O2 sats in the 94% range. Pt complained of pain once throughout the night shift that was moderately relieved with oral PRN medication. Pt repositioned with trapeze bar as needed. Pt able to voice concerns or issues. Pt reluctant about the discontinuation of josue catheter. Per day shift RN, Dr. Winn is aware of situation. Educated pt the risks of continuation of josue, patient voiced understanding. Patient progressing towards goals as tolerated. Plan of care reviewed with pt, all concerns addressed. Will continue to monitor

## 2017-06-02 NOTE — PT/OT/SLP PROGRESS
Physical Therapy  Treatment    Omari Diallo   MRN: [id number]   Admitting Diagnosis: Closed intertrochanteric fracture of left femur    PT Received On: 06/02/17  PT Start Time: 1325     PT Stop Time: 1340    PT Total Time (min): 15 min       Billable Minutes:  Therapeutic Activity 15min    Treatment Type: Treatment  PT/PTA: PTA     PTA Visit Number: 1       General Precautions: Standard, fall  Orthopedic Precautions: LLE weight bearing as tolerated   Braces:           Subjective:  Communicated with nurse Carrasco prior to session.  Agreed to mobilize.    Pain/Comfort  Pain Rating 1: 7/10  Location - Side 1: Left  Location - Orientation 1: generalized  Location 1: hip  Pain Addressed 1: Reposition, Nurse notified    Objective:   Patient found with: peripheral IV, telemetry, josue catheter    Functional Mobility:  Bed Mobility:   Rolling/Turning Right: Minimum assistance  Scooting/Bridging: Contact Guard Assistance, With trapeze  Supine to Sit: Moderate Assistance, With side rail  Sit to Supine: Maximum Assistance    Transfers:  Sit <> Stand Assistance: Moderate Assistance  Sit <> Stand Assistive Device: Rolling Walker  Bed <> Chair Technique: Stand Pivot  Bed <> Chair Assistance: Maximum Assistance  Bed <> Chair Assistive Device: Rolling Walker    Gait:   Gait Distance: no gait, unable to take steps.  Assistance 1: Minimum assistance  Gait Assistive Device: Rolling walker  Gait Pattern: 3-point gait  Gait Deviation(s): decreased otoniel, decreased velocity of limb motion, decreased step length, decreased weight-shifting ability    Stairs:      Balance:   Static Sit: FAIR: Maintains without assist, but unable to take any challenges   Dynamic Sit: FAIR: Cannot move trunk without losing balance  Static Stand: 0: Needs MAXIMAL assist to maintain   Dynamic stand:      Therapeutic Activities and Exercises:  Seated in chair at bedside.  Sit to stand with Max A. SPT with Max A and increased verbal cues for technique using rw.  Unable to tolerate weight LLE .     AM-PAC 6 CLICK MOBILITY  How much help from another person does this patient currently need?   1 = Unable, Total/Dependent Assistance  2 = A lot, Maximum/Moderate Assistance  3 = A little, Minimum/Contact Guard/Supervision  4 = None, Modified [place]/Independent         AM-PAC Raw Score CMS G-Code Modifier Level of Impairment Assistance   6 % Total / Unable   7 - 9 CM 80 - 100% Maximal Assist   10 - 14 CL 60 - 80% Moderate Assist   15 - 19 CK 40 - 60% Moderate Assist   20 - 22 CJ 20 - 40% Minimal Assist   23 CI 1-20% SBA / CGA   24 CH 0% Independent/ Mod I     Patient left supine with all lines intact, call button in reach and nurse Danilo notified.    Assessment:  Omari Diallo is a 77 y.o. female with a medical diagnosis of Closed intertrochanteric fracture of left femur and presents with pain , weakness.    Rehab identified problem list/impairments: Rehab identified problem list/impairments: weakness, impaired functional mobilty, pain, gait instability, impaired endurance, impaired balance, decreased ROM, impaired self care skills    Rehab potential is fair.    Activity tolerance: Fair    Discharge recommendations:       Barriers to discharge:      Equipment recommendations:       GOALS:    Physical Therapy Goals        Problem: Physical Therapy Goal    Goal Priority Disciplines Outcome Goal Variances Interventions   Physical Therapy Goal     PT/OT, PT Ongoing (interventions implemented as appropriate)     Description:  Goals to be met by: 2017     Patient will increase functional independence with mobility by performin). Supine to sit with Contact Guard Assistance  2). Sit to supine with Contact Guard Assistance  3). Sit to stand transfer with Stand-by Assistance  4). Gait  x > 50 feet with Stand-by Assistance using Rolling Walker.                       PLAN:    Patient to be seen BID (daily Sat and Sun)  to address the above listed problems via gait  training, therapeutic activities, therapeutic exercises  Plan of Care expires: 06/08/17  Plan of Care reviewed with: patient         Kalyani Campo, PTA  06/02/2017

## 2017-06-02 NOTE — ANESTHESIA POSTPROCEDURE EVALUATION
"Anesthesia Post Evaluation    Patient: Omari Diallo    Procedure(s) Performed: Procedure(s) (LRB):  [id number], Synthes, fracture table (Left)    Final Anesthesia Type: general  Patient location during evaluation: PACU  Patient participation: Yes- Able to Participate  Level of consciousness: awake and alert  Post-procedure vital signs: reviewed and stable  Pain management: adequate  Airway patency: patent  PONV status at discharge: No PONV  Anesthetic complications: no      Cardiovascular status: blood pressure returned to baseline and hemodynamically stable  Respiratory status: unassisted  Hydration status: euvolemic  Follow-up not needed.        Visit Vitals  BP (!) 116/57 (BP Location: Left arm, Patient Position: Lying, BP Method: Automatic)   Pulse 78   Temp 36.7 °C (98 °F) (Oral)   Resp 18   Ht 5' 4" (1.626 m)   Wt 54.5 kg (120 lb 2.4 oz)   SpO2 96%   Breastfeeding? No   BMI 20.62 kg/m²       Pain/Danika Score: Pain Assessment Performed: Yes (6/1/2017  6:00 PM)  Presence of Pain: denies (6/1/2017  6:00 PM)  Pain Rating Prior to Med Admin: 7 (6/1/2017 10:07 PM)  Pain Rating Post Med Admin: 4 (6/1/2017  6:24 AM)  Danika Score: 8 (5/31/2017 10:15 PM)      "

## 2017-06-02 NOTE — PLAN OF CARE
Problem: Physical Therapy Goal  Goal: Physical Therapy Goal  Goals to be met by: 2017     Patient will increase functional independence with mobility by performin). Supine to sit with Contact Guard Assistance  2). Sit to supine with Contact Guard Assistance  3). Sit to stand transfer with Stand-by Assistance  4). Gait  x > 50 feet with Stand-by Assistance using Rolling Walker.      Outcome: Ongoing (interventions implemented as appropriate)  Ambulated 6 steps with rw and Min A , WBAT LLE. Difficulty tolerating WB'ing LLE.

## 2017-06-02 NOTE — PT/OT/SLP EVAL
"Occupational Therapy  Evaluation    Omari Diallo   MRN: [id number]   Admitting Diagnosis: Closed intertrochanteric fracture of left femur    OT Date of Treatment: 06/02/17   OT Start Time: 1330  OT Stop Time: 1347  OT Total Time (min): 17 min    Billable Minutes:  Evaluation 17    Diagnosis: Closed intertrochanteric fracture of left femur       History reviewed. No pertinent past medical history.   Past Surgical History:   Procedure Laterality Date    APPENDECTOMY         Referring physician:   Date referred to OT: 6-1-17    General Precautions: Standard, fall  Orthopedic Precautions: LLE weight bearing as tolerated  Braces: N/A    Do you have any cultural, spiritual, Latter day conflicts, given your current situation?: none     Patient History:  Living Environment  Lives With: spouse  Living Arrangements: house  Home Accessibility:  (no concerns)  Equipment Currently Used at Home: walker, standard    Prior level of function:   Bed Mobility/Transfers: needs device  Grooming: independent  Bathing: independent  Upper Body Dressing: independent  Lower Body Dressing: independent  Toileting: independent  Home Management Skills: needs assist  Homemaking Responsibilities:  (shares with spouse)  Driving License: No  Mode of Transportation: Family, Friends     Dominant hand: right    Subjective:  Communicated with nurse prior to session.    Chief Complaint: " I didn't sleep last night, I wish you would just get out of here."  Patient/Family stated goals: home    Pain/Comfort  Pain Rating 1: 8/10 (when coughing)  Location - Side 1: Left  Location - Orientation 1: generalized  Location 1: hip  Pain Addressed 1: Reposition, Cessation of Activity  Pain Rating Post-Intervention 1: 5/10    Objective:  Patient found with: peripheral IV, SCD, bed alarm    Cognitive Exam:  Oriented to: Person, Place, Time and Situation  Follows Commands/attention: Follows multistep  commands  Communication: clear/fluent  Memory:  No Deficits " noted  Safety awareness/insight to disability: impaired  Coping skills/emotional control: Appropriate to situation    Visual/perceptual:  Intact    Physical Exam:  Postural examination/scapula alignment: Rounded shoulder and Posterior pelvic tilt  Skin integrity: Visible skin intact  Edema: Mild L LE    Sensation:   Intact    Upper Extremity Range of Motion:  Right Upper Extremity: WFL  Left Upper Extremity: WFL    Upper Extremity Strength:  Right Upper Extremity: WFL  Left Upper Extremity: WFL   Strength: WFL    Fine motor coordination:   Intact    Functional Mobility:  Bed Mobility:  Rolling/Turning Right: Minimum assistance  Scooting/Bridging: Moderate Assistance  Supine to Sit: Maximum Assistance  Sit to Supine: Maximum Assistance    Transfers:  Sit <> Stand Assistance:  (refused 2/2 just returning to bed by PT)    Functional Ambulation: Pt refused to stand, agreeable to EOB only for brief session.    Activities of Daily Living:  Feeding Level of Assistance: Modified independent    UE Dressing Level of Assistance: Minimum assistance    LE Dressing Level of Assistance: Total assistance      Balance:   Static Sit: GOOD: Takes MODERATE challenges from all directions  Dynamic Sit: GOOD: Maintains balance through MODERATE excursions of active trunk movement  Therapeutic Activities and Exercises:  Adaptive ADL equipment issued: sock aid, reacher and sponge long handled. Pt instructed in use of all.    AM-PAC 6 CLICK ADL  How much help from another person does this patient currently need?  1 = Unable, Total/Dependent Assistance  2 = A lot, Maximum/Moderate Assistance  3 = A little, Minimum/Contact Guard/Supervision  4 = None, Modified [place]/Independent    Putting on and taking off regular lower body clothing? : 2  Bathing (including washing, rinsing, drying)?: 2  Toileting, which includes using toilet, bedpan, or urinal? : 2  Putting on and taking off regular upper body clothing?: 4  Taking care of personal  "grooming such as brushing teeth?: 4  Eating meals?: 4  Total Score: 18    AM-PAC Raw Score CMS "G-Code Modifier Level of Impairment Assistance   6 % Total / Unable   7 - 9 CM 80 - 100% Maximal Assist   10-14 CL 60 - 80% Moderate Assist   15 - 19 CK 40 - 60% Moderate Assist   20 - 22 CJ 20 - 40% Minimal Assist   23 CI 1-20% SBA / CGA   24 CH 0% Independent/ Mod I       Patient left HOB elevated with all lines intact and call button in reach    Assessment:  Omari Diallo is a 77 y.o. female with a medical diagnosis of Closed intertrochanteric fracture of left femur .    Rehab identified problem list/impairments: Rehab identified problem list/impairments: decreased lower extremity function, pain, decreased ROM, impaired self care skills, impaired functional mobilty, impaired endurance, weakness, impaired balance    Rehab potential is good.    Activity tolerance: Poor    Discharge recommendations: Discharge Facility/Level Of Care Needs: home health PT, home health OT, nursing facility, skilled     Barriers to discharge: Barriers to Discharge: Other (Comment) ( not present, pt may benefit from short skilled admission)    Equipment recommendations: shower chair     GOALS:    Occupational Therapy Goals        Problem: Occupational Therapy Goal    Goal Priority Disciplines Outcome Interventions   Occupational Therapy Goal     OT, PT/OT Ongoing (interventions implemented as appropriate)    Description:  Goals to be met by: 6-9-17     Patient will increase functional independence with ADLs by performing:    LE Dressing with Minimal Assistance.  Toileting from bedside commode over toilet with Minimal Assistance for hygiene and clothing management.   Stand pivot transfers with Minimal Assistance.  Toilet transfer to bedside commode over toilet with Minimal Assistance.                      PLAN:  Patient to be seen 2 x/week, 3 x/week to address the above listed problems via self-care/home management, therapeutic " activities, therapeutic exercises  Plan of Care expires: 07/02/17  Plan of Care reviewed with: patient         Mary ZELALEM Hameed  06/02/2017

## 2017-06-03 LAB
ANION GAP SERPL CALC-SCNC: 9 MMOL/L
BACTERIA UR CULT: NORMAL
BASOPHILS # BLD AUTO: 0 K/UL
BASOPHILS NFR BLD: 0.2 %
BUN SERPL-MCNC: 4 MG/DL
CALCIUM SERPL-MCNC: 8.7 MG/DL
CHLORIDE SERPL-SCNC: 103 MMOL/L
CO2 SERPL-SCNC: 29 MMOL/L
CREAT SERPL-MCNC: 0.5 MG/DL
DIFFERENTIAL METHOD: ABNORMAL
EOSINOPHIL # BLD AUTO: 0.1 K/UL
EOSINOPHIL NFR BLD: 1.3 %
ERYTHROCYTE [DISTWIDTH] IN BLOOD BY AUTOMATED COUNT: 13.9 %
EST. GFR  (AFRICAN AMERICAN): >60 ML/MIN/1.73 M^2
EST. GFR  (NON AFRICAN AMERICAN): >60 ML/MIN/1.73 M^2
GLUCOSE SERPL-MCNC: 94 MG/DL
HCT VFR BLD AUTO: 26.5 %
HGB BLD-MCNC: 9 G/DL
LYMPHOCYTES # BLD AUTO: 1.7 K/UL
LYMPHOCYTES NFR BLD: 15.6 %
MCH RBC QN AUTO: 33.4 PG
MCHC RBC AUTO-ENTMCNC: 34 %
MCV RBC AUTO: 98 FL
MONOCYTES # BLD AUTO: 1.2 K/UL
MONOCYTES NFR BLD: 11.6 %
NEUTROPHILS # BLD AUTO: 7.7 K/UL
NEUTROPHILS NFR BLD: 71.3 %
PLATELET # BLD AUTO: 271 K/UL
PMV BLD AUTO: 7.6 FL
POTASSIUM SERPL-SCNC: 2.6 MMOL/L
RBC # BLD AUTO: 2.69 M/UL
SODIUM SERPL-SCNC: 141 MMOL/L
WBC # BLD AUTO: 10.7 K/UL

## 2017-06-03 PROCEDURE — 25000003 PHARM REV CODE 250: Performed by: NURSE PRACTITIONER

## 2017-06-03 PROCEDURE — 85025 COMPLETE CBC W/AUTO DIFF WBC: CPT

## 2017-06-03 PROCEDURE — 36415 COLL VENOUS BLD VENIPUNCTURE: CPT

## 2017-06-03 PROCEDURE — 25000003 PHARM REV CODE 250: Performed by: FAMILY MEDICINE

## 2017-06-03 PROCEDURE — 80048 BASIC METABOLIC PNL TOTAL CA: CPT

## 2017-06-03 PROCEDURE — 63600175 PHARM REV CODE 636 W HCPCS: Performed by: ORTHOPAEDIC SURGERY

## 2017-06-03 PROCEDURE — 11000001 HC ACUTE MED/SURG PRIVATE ROOM

## 2017-06-03 PROCEDURE — 63600175 PHARM REV CODE 636 W HCPCS: Performed by: INTERNAL MEDICINE

## 2017-06-03 PROCEDURE — 97116 GAIT TRAINING THERAPY: CPT

## 2017-06-03 PROCEDURE — 94761 N-INVAS EAR/PLS OXIMETRY MLT: CPT

## 2017-06-03 PROCEDURE — 25000003 PHARM REV CODE 250: Performed by: HOSPITALIST

## 2017-06-03 PROCEDURE — 97110 THERAPEUTIC EXERCISES: CPT

## 2017-06-03 PROCEDURE — 25000003 PHARM REV CODE 250: Performed by: INTERNAL MEDICINE

## 2017-06-03 RX ORDER — POTASSIUM CHLORIDE 20 MEQ/1
60 TABLET, EXTENDED RELEASE ORAL ONCE
Status: COMPLETED | OUTPATIENT
Start: 2017-06-03 | End: 2017-06-03

## 2017-06-03 RX ORDER — POTASSIUM CHLORIDE 20 MEQ/1
20 TABLET, EXTENDED RELEASE ORAL ONCE
Status: COMPLETED | OUTPATIENT
Start: 2017-06-04 | End: 2017-06-04

## 2017-06-03 RX ORDER — MAGNESIUM SULFATE/D5W 2 G/50 ML
2 INTRAVENOUS SOLUTION, PIGGYBACK (ML) INTRAVENOUS ONCE
Status: COMPLETED | OUTPATIENT
Start: 2017-06-04 | End: 2017-06-04

## 2017-06-03 RX ORDER — CIPROFLOXACIN 500 MG/1
500 TABLET ORAL EVERY 12 HOURS
Status: DISCONTINUED | OUTPATIENT
Start: 2017-06-03 | End: 2017-06-04

## 2017-06-03 RX ORDER — POLYETHYLENE GLYCOL 3350 17 G/17G
17 POWDER, FOR SOLUTION ORAL 3 TIMES DAILY
Status: DISPENSED | OUTPATIENT
Start: 2017-06-03 | End: 2017-06-05

## 2017-06-03 RX ORDER — AMOXICILLIN 250 MG
2 CAPSULE ORAL ONCE
Status: COMPLETED | OUTPATIENT
Start: 2017-06-03 | End: 2017-06-03

## 2017-06-03 RX ADMIN — POLYETHYLENE GLYCOL 3350 17 G: 17 POWDER, FOR SOLUTION ORAL at 10:06

## 2017-06-03 RX ADMIN — HYDROCODONE BITARTRATE AND ACETAMINOPHEN 1 TABLET: 5; 325 TABLET ORAL at 10:06

## 2017-06-03 RX ADMIN — CIPROFLOXACIN HYDROCHLORIDE 500 MG: 500 TABLET, FILM COATED ORAL at 10:06

## 2017-06-03 RX ADMIN — ENOXAPARIN SODIUM 40 MG: 100 INJECTION SUBCUTANEOUS at 09:06

## 2017-06-03 RX ADMIN — THIAMINE HYDROCHLORIDE: 100 INJECTION, SOLUTION INTRAMUSCULAR; INTRAVENOUS at 11:06

## 2017-06-03 RX ADMIN — PANTOPRAZOLE SODIUM 40 MG: 40 TABLET, DELAYED RELEASE ORAL at 09:06

## 2017-06-03 RX ADMIN — STANDARDIZED SENNA CONCENTRATE AND DOCUSATE SODIUM 2 TABLET: 8.6; 5 TABLET, FILM COATED ORAL at 10:06

## 2017-06-03 RX ADMIN — POTASSIUM CHLORIDE 60 MEQ: 20 TABLET, EXTENDED RELEASE ORAL at 09:06

## 2017-06-03 RX ADMIN — CIPROFLOXACIN HYDROCHLORIDE 500 MG: 500 TABLET, FILM COATED ORAL at 09:06

## 2017-06-03 NOTE — PLAN OF CARE
Problem: Physical Therapy Goal  Goal: Physical Therapy Goal  Goals to be met by: 2017     Patient will increase functional independence with mobility by performin). Supine to sit with Contact Guard Assistance  2). Sit to supine with Contact Guard Assistance  3). Sit to stand transfer with Stand-by Assistance  4). Gait  x > 50 feet with Stand-by Assistance using Rolling Walker.      Outcome: Ongoing (interventions implemented as appropriate)  Ambulated 30' with r wand Min A, WBAT LLE.

## 2017-06-03 NOTE — PLAN OF CARE
Problem: Patient Care Overview  Goal: Plan of Care Review  Patient with uneventful night, slept well between care. Adequate urine noted in josue. Left surgical hip drsg dry and intact. VS stable. Medicated X 1 for complaints of hip pain. Call light at bedside. Monitoring on Telemetry.

## 2017-06-03 NOTE — PT/OT/SLP PROGRESS
Physical Therapy  Treatment    Omari Diallo   MRN: [id number]   Admitting Diagnosis: Closed intertrochanteric fracture of left femur    PT Received On: 06/03/17  PT Start Time: 0835     PT Stop Time: 0855    PT Total Time (min): 20 min       Billable Minutes:  Gait [id number]  and Therapeutic Exercise 10min    Treatment Type: Treatment  PT/PTA: PTA     PTA Visit Number: 2       General Precautions: Standard, fall  Orthopedic Precautions: LLE weight bearing as tolerated   Braces: N/A         Subjective:  Communicated with nurse Hendricks prior to session.  Agreed to ambulate. Reports feeling better than yesterday.    Pain/Comfort  Pain Rating 1: 4/10  Location - Side 1: Left  Location - Orientation 1: generalized  Location 1: hip  Pain Addressed 1: Other (see comments) (did not want pain medicine. Reported it felt better than it did.)    Objective:   Patient found with: SCD, telemetry, josue catheter, bed alarm    Functional Mobility:  Bed Mobility:   Rolling/Turning Right: Contact guard assistance  Scooting/Bridging: Contact Guard Assistance  Supine to Sit: Minimum Assistance    Transfers:  Sit <> Stand Assistance: Minimum Assistance  Sit <> Stand Assistive Device: Rolling Walker    Gait:   Gait Distance: 30'  Assistance 1: Minimum assistance  Gait Assistive Device: Rolling walker  Gait Pattern: 3-point gait  Gait Deviation(s): decreased otoniel, decreased velocity of limb motion, decreased step length, decreased weight-shifting ability    Stairs:      Balance:   Static Sit: FAIR: Maintains without assist, but unable to take any challenges   Dynamic Sit: FAIR+: Maintains balance through MINIMAL excursions of active trunk motion  Static Stand: FAIR: Maintains without assist but unable to take challenges  Dynamic stand: POOR: N/A     Therapeutic Activities and Exercises:  Ambulated 30' with rw and Min A, WBAT LLE. Performed LE exercises seated In chair at 10 reps each : TKE's, Hip flexion, AP's, HS and SLR's.    AM-PAC 6  CLICK MOBILITY  How much help from another person does this patient currently need?   1 = Unable, Total/Dependent Assistance  2 = A lot, Maximum/Moderate Assistance  3 = A little, Minimum/Contact Guard/Supervision  4 = None, Modified [place]/Independent         AM-PAC Raw Score CMS G-Code Modifier Level of Impairment Assistance   6 % Total / Unable   7 - 9 CM 80 - 100% Maximal Assist   10 - 14 CL 60 - 80% Moderate Assist   15 - 19 CK 40 - 60% Moderate Assist   20 - 22 CJ 20 - 40% Minimal Assist   23 CI 1-20% SBA / CGA   24 CH 0% Independent/ Mod I     Patient left up in chair with all lines intact, call button in reach, nurse Ruthie  notified and CNA present.    Assessment:  Omari Diallo is a 77 y.o. female with a medical diagnosis of Closed intertrochanteric fracture of left femur and presents with weakness, limited endurance. Mobility improved. Pain decreased with activity this session.    Rehab identified problem list/impairments: Rehab identified problem list/impairments: weakness, impaired endurance, gait instability, pain, decreased lower extremity function    Rehab potential is good.    Activity tolerance: Fair    Discharge recommendations: Discharge Facility/Level Of Care Needs: home health PT, nursing facility, skilled     Barriers to discharge:      Equipment recommendations:       GOALS:    Physical Therapy Goals        Problem: Physical Therapy Goal    Goal Priority Disciplines Outcome Goal Variances Interventions   Physical Therapy Goal     PT/OT, PT Ongoing (interventions implemented as appropriate)     Description:  Goals to be met by: 2017     Patient will increase functional independence with mobility by performin). Supine to sit with Contact Guard Assistance  2). Sit to supine with Contact Guard Assistance  3). Sit to stand transfer with Stand-by Assistance  4). Gait  x > 50 feet with Stand-by Assistance using Rolling Walker.                       PLAN:    Patient to be seen  BID (daily Sat and Sun)  to address the above listed problems via gait training, therapeutic activities, therapeutic exercises  Plan of Care expires: 06/08/17  Plan of Care reviewed with: patient         Kalyani Campo, STEVO  06/03/2017

## 2017-06-03 NOTE — PLAN OF CARE
Results for NILA MEYER (MRN [id number]) as of 6/3/2017 07:02   Ref. Range 6/3/2017 05:50   Potassium Latest Ref Range: 3.5 - 5.1 mmol/L 2.6 (LL)   Notified Dr. Anderson , received order for Potassium 60meq po x 1.

## 2017-06-03 NOTE — PLAN OF CARE
Problem: Patient Care Overview  Goal: Plan of Care Review  Outcome: Ongoing (interventions implemented as appropriate)  Haynes catheter removed this am. Pt voiding with no problems throughout this shift. Pt assisted to and from the bedside commode as needed for bathroom. Pt has sat up most of this shift in chair. Pt has refused analgesic for pain as pt states she does not need anything for her pain due to it does not feel bad enough for a pain pill. Pt with good appetite. Call light in easy reach.

## 2017-06-03 NOTE — PROGRESS NOTES
Patient still refusing to have josue removed, MD aware, prefers josue out but will respect patient's wishes at this time, patient agrees to have josue removed in am.

## 2017-06-04 LAB
ANION GAP SERPL CALC-SCNC: 7 MMOL/L
BASOPHILS # BLD AUTO: 0.1 K/UL
BASOPHILS NFR BLD: 0.6 %
BUN SERPL-MCNC: 5 MG/DL
CALCIUM SERPL-MCNC: 8.7 MG/DL
CHLORIDE SERPL-SCNC: 105 MMOL/L
CO2 SERPL-SCNC: 29 MMOL/L
CREAT SERPL-MCNC: 0.5 MG/DL
DIFFERENTIAL METHOD: ABNORMAL
EOSINOPHIL # BLD AUTO: 0.3 K/UL
EOSINOPHIL NFR BLD: 3.6 %
ERYTHROCYTE [DISTWIDTH] IN BLOOD BY AUTOMATED COUNT: 12.6 %
EST. GFR  (AFRICAN AMERICAN): >60 ML/MIN/1.73 M^2
EST. GFR  (NON AFRICAN AMERICAN): >60 ML/MIN/1.73 M^2
GLUCOSE SERPL-MCNC: 97 MG/DL
HCT VFR BLD AUTO: 26.9 %
HGB BLD-MCNC: 9.3 G/DL
LYMPHOCYTES # BLD AUTO: 1.9 K/UL
LYMPHOCYTES NFR BLD: 22.5 %
MAGNESIUM SERPL-MCNC: 2.2 MG/DL
MCH RBC QN AUTO: 33.6 PG
MCHC RBC AUTO-ENTMCNC: 34.5 %
MCV RBC AUTO: 97 FL
MONOCYTES # BLD AUTO: 1.1 K/UL
MONOCYTES NFR BLD: 12.7 %
NEUTROPHILS # BLD AUTO: 5 K/UL
NEUTROPHILS NFR BLD: 60.6 %
PLATELET # BLD AUTO: 313 K/UL
PMV BLD AUTO: 7.6 FL
POTASSIUM SERPL-SCNC: 3.2 MMOL/L
RBC # BLD AUTO: 2.77 M/UL
SODIUM SERPL-SCNC: 141 MMOL/L
WBC # BLD AUTO: 8.4 K/UL

## 2017-06-04 PROCEDURE — 36415 COLL VENOUS BLD VENIPUNCTURE: CPT

## 2017-06-04 PROCEDURE — 85025 COMPLETE CBC W/AUTO DIFF WBC: CPT

## 2017-06-04 PROCEDURE — 83735 ASSAY OF MAGNESIUM: CPT

## 2017-06-04 PROCEDURE — 63600175 PHARM REV CODE 636 W HCPCS: Performed by: ORTHOPAEDIC SURGERY

## 2017-06-04 PROCEDURE — 25000003 PHARM REV CODE 250: Performed by: INTERNAL MEDICINE

## 2017-06-04 PROCEDURE — 63600175 PHARM REV CODE 636 W HCPCS: Performed by: INTERNAL MEDICINE

## 2017-06-04 PROCEDURE — 97116 GAIT TRAINING THERAPY: CPT

## 2017-06-04 PROCEDURE — 11000001 HC ACUTE MED/SURG PRIVATE ROOM

## 2017-06-04 PROCEDURE — 25000003 PHARM REV CODE 250: Performed by: HOSPITALIST

## 2017-06-04 PROCEDURE — 99900035 HC TECH TIME PER 15 MIN (STAT)

## 2017-06-04 PROCEDURE — 94640 AIRWAY INHALATION TREATMENT: CPT

## 2017-06-04 PROCEDURE — 25000242 PHARM REV CODE 250 ALT 637 W/ HCPCS: Performed by: HOSPITALIST

## 2017-06-04 PROCEDURE — 80048 BASIC METABOLIC PNL TOTAL CA: CPT

## 2017-06-04 PROCEDURE — 97110 THERAPEUTIC EXERCISES: CPT

## 2017-06-04 PROCEDURE — 25000003 PHARM REV CODE 250: Performed by: NURSE PRACTITIONER

## 2017-06-04 PROCEDURE — 94761 N-INVAS EAR/PLS OXIMETRY MLT: CPT

## 2017-06-04 RX ORDER — IPRATROPIUM BROMIDE 0.5 MG/2.5ML
0.5 SOLUTION RESPIRATORY (INHALATION) EVERY 4 HOURS PRN
Status: DISCONTINUED | OUTPATIENT
Start: 2017-06-04 | End: 2017-06-07 | Stop reason: HOSPADM

## 2017-06-04 RX ORDER — CIPROFLOXACIN 500 MG/1
500 TABLET ORAL EVERY 12 HOURS
Status: COMPLETED | OUTPATIENT
Start: 2017-06-04 | End: 2017-06-04

## 2017-06-04 RX ORDER — IPRATROPIUM BROMIDE 0.5 MG/2.5ML
0.5 SOLUTION RESPIRATORY (INHALATION)
Status: DISCONTINUED | OUTPATIENT
Start: 2017-06-04 | End: 2017-06-04

## 2017-06-04 RX ORDER — POTASSIUM CHLORIDE 20 MEQ/1
40 TABLET, EXTENDED RELEASE ORAL ONCE
Status: COMPLETED | OUTPATIENT
Start: 2017-06-04 | End: 2017-06-04

## 2017-06-04 RX ORDER — FLUTICASONE FUROATE AND VILANTEROL 100; 25 UG/1; UG/1
1 POWDER RESPIRATORY (INHALATION) DAILY
Status: DISCONTINUED | OUTPATIENT
Start: 2017-06-04 | End: 2017-06-07 | Stop reason: HOSPADM

## 2017-06-04 RX ADMIN — CIPROFLOXACIN 500 MG: 500 TABLET, FILM COATED ORAL at 08:06

## 2017-06-04 RX ADMIN — CIPROFLOXACIN HYDROCHLORIDE 500 MG: 500 TABLET, FILM COATED ORAL at 10:06

## 2017-06-04 RX ADMIN — THIAMINE HYDROCHLORIDE: 100 INJECTION, SOLUTION INTRAMUSCULAR; INTRAVENOUS at 10:06

## 2017-06-04 RX ADMIN — PANTOPRAZOLE SODIUM 40 MG: 40 TABLET, DELAYED RELEASE ORAL at 10:06

## 2017-06-04 RX ADMIN — HYDROCODONE BITARTRATE AND ACETAMINOPHEN 1 TABLET: 5; 325 TABLET ORAL at 11:06

## 2017-06-04 RX ADMIN — POLYETHYLENE GLYCOL 3350 17 G: 17 POWDER, FOR SOLUTION ORAL at 05:06

## 2017-06-04 RX ADMIN — POTASSIUM CHLORIDE 40 MEQ: 20 TABLET, EXTENDED RELEASE ORAL at 10:06

## 2017-06-04 RX ADMIN — FLUTICASONE FUROATE AND VILANTEROL TRIFENATATE 1 PUFF: 100; 25 POWDER RESPIRATORY (INHALATION) at 11:06

## 2017-06-04 RX ADMIN — POTASSIUM CHLORIDE 20 MEQ: 20 TABLET, EXTENDED RELEASE ORAL at 12:06

## 2017-06-04 RX ADMIN — ENOXAPARIN SODIUM 40 MG: 100 INJECTION SUBCUTANEOUS at 10:06

## 2017-06-04 RX ADMIN — POLYETHYLENE GLYCOL 3350 17 G: 17 POWDER, FOR SOLUTION ORAL at 02:06

## 2017-06-04 RX ADMIN — Medication 2 G: at 12:06

## 2017-06-04 NOTE — PT/OT/SLP PROGRESS
Physical Therapy  Treatment    Omari Diallo   MRN: [id number]   Admitting Diagnosis: Closed intertrochanteric fracture of left femur    PT Received On: 06/04/17  PT Start Time: 0830     PT Stop Time: 0850    PT Total Time (min): 20 min       Billable Minutes:  Gait [id number] and Therapeutic Exercise 10min    Treatment Type: Treatment  PT/PTA: PTA     PTA Visit Number: 3       General Precautions: Standard, fall  Orthopedic Precautions: LLE weight bearing as tolerated   Braces:           Subjective:  Communicated with nurse Hendricks prior to session.  Agreed to mobilize.    Pain/Comfort  Pain Rating 1: 0/10    Objective:   Patient found with: telemetry    Functional Mobility:  Bed Mobility:        Transfers:  Sit <> Stand Assistance: Minimum Assistance  Sit <> Stand Assistive Device: Rolling Walker    Gait:   Gait Distance: 40'  Assistance 1: Minimum assistance  Gait Assistive Device: Rolling walker  Gait Pattern: 3-point gait  Gait Deviation(s): decreased otoniel, decreased velocity of limb motion, decreased step length, decreased weight-shifting ability    Stairs:      Balance:   Static Sit: FAIR: Maintains without assist, but unable to take any challenges   Dynamic Sit: FAIR+: Maintains balance through MINIMAL excursions of active trunk motion  Static Stand: FAIR: Maintains without assist but unable to take challenges  Dynamic stand: POOR: N/A     Therapeutic Activities and Exercises:  Seated in chair at bedside.  Ambulated 40' with rw and CG / Min A, WBAT LLE. Performed BLE exercises at 10 reps each : TKE's, AP's, SLR's, QS, Hip abd / add RLE only.       AM-PAC 6 CLICK MOBILITY  How much help from another person does this patient currently need?   1 = Unable, Total/Dependent Assistance  2 = A lot, Maximum/Moderate Assistance  3 = A little, Minimum/Contact Guard/Supervision  4 = None, Modified [place]/Independent         AM-PAC Raw Score CMS G-Code Modifier Level of Impairment Assistance   6 % Total /  Unable   7 - 9 CM 80 - 100% Maximal Assist   10 - 14 CL 60 - 80% Moderate Assist   15 - 19 CK 40 - 60% Moderate Assist   20 - 22 CJ 20 - 40% Minimal Assist   23 CI 1-20% SBA / CGA   24 CH 0% Independent/ Mod I     Patient left up in chair with all lines intact, call button in reach and nurse Ruthie notified.    Assessment:  Omari Diallo is a 77 y.o. female with a medical diagnosis of Closed intertrochanteric fracture of left femur and presents with limited endurance.    Rehab identified problem list/impairments: Rehab identified problem list/impairments: weakness, impaired endurance, gait instability, decreased lower extremity function    Rehab potential is good.    Activity tolerance: Good    Discharge recommendations: Discharge Facility/Level Of Care Needs: home health PT, nursing facility, skilled     Barriers to discharge:      Equipment recommendations:       GOALS:    Physical Therapy Goals        Problem: Physical Therapy Goal    Goal Priority Disciplines Outcome Goal Variances Interventions   Physical Therapy Goal     PT/OT, PT Ongoing (interventions implemented as appropriate)     Description:  Goals to be met by: 2017     Patient will increase functional independence with mobility by performin). Supine to sit with Contact Guard Assistance  2). Sit to supine with Contact Guard Assistance  3). Sit to stand transfer with Stand-by Assistance  4). Gait  x > 50 feet with Stand-by Assistance using Rolling Walker.                       PLAN:    Patient to be seen BID (daily Sat and Sun)  to address the above listed problems via therapeutic activities, therapeutic exercises  Plan of Care expires: 17  Plan of Care reviewed with: patient         Kalyani Campo, PTA  2017

## 2017-06-04 NOTE — PLAN OF CARE
Problem: Patient Care Overview  Goal: Plan of Care Review  Outcome: Ongoing (interventions implemented as appropriate)  Pt remains free from injury or falls. Vital signs stable throughout night on room air. Positions self independently and up to bedside commode with assistance. Telemetry maintained. Pain managed with  PO medications. Dressing to left hip intact.  Bed in low locked position and call light within reach.  Will continue to monitor.

## 2017-06-04 NOTE — PROGRESS NOTES
Daily Orthopaedic Progress Note    Omari Diallo is a 77 y.o. female admitted on 5/30/2017  Hospital Day: 5  Post Op Day: 4 Days Post-Op      The patient was seen and examined this morning at the bedside. Patient reports no acute issues overnight and adequate control of pain on current regimen.  Patient worked with physical therapy over the last 24 hours.      PHYSICAL EXAM:  Awake/alert/oriented x3, No acute distress, Afebrile, Vital signs stable  Good inspiratory effort with unlaboured breathing  Dressings c/d/i  NVI distally  No si/sx of infection    Vitals:    06/04/17 0415 06/04/17 0753 06/04/17 1151 06/04/17 1300   BP: (!) 142/70 124/75  133/63   BP Location: Left arm Left arm     Patient Position: Lying Sitting     BP Method: Automatic Automatic     Pulse: 79 68 88    Resp: 18 18 18    Temp: 97.9 °F (36.6 °C) 98 °F (36.7 °C) 98.6 °F (37 °C)    TempSrc: Oral Oral Oral    SpO2: 97% 97% 95%    Weight:       Height:         I/O last 3 completed shifts:  In: 600 [P.O.:600]  Out: 2700 [Urine:2700]    Recent Labs  Lab 06/02/17  0535 06/03/17  0550 06/04/17  0536   CALCIUM 8.8 8.7 8.7    141 141   K 2.8* 2.6* 3.2*   CO2 28 29 29    103 105   BUN 3* 4* 5*   CREATININE 0.5 0.5 0.5       Recent Labs  Lab 06/02/17  0535 06/03/17  0550 06/04/17  0536   WBC 14.10* 10.70 8.40   RBC 2.91* 2.69* 2.77*   HGB 9.7* 9.0* 9.3*   HCT 28.5* 26.5* 26.9*    271 313     No results for input(s): INR, APTT in the last 72 hours.    Invalid input(s): PT    A/P: 77 y.o. female 4 Days Post-Op s/p left hip IM nail  -Continue with current pain control regimen  -Continue with current physical therapy plan, WBAT  -Continue with DVT prophylaxis  -Disposition per primary    Mau Segovia MD  [facility]

## 2017-06-04 NOTE — PROGRESS NOTES
"Progress Note  Hospital Medicine  Patient Name:Omari Diallo  MRN:  [id number]  Patient Class: IP- Inpatient  Admit Date: 5/30/2017  Length of Stay: 4 days  Expected Discharge Date:   Attending Physician: Manjeet Roe MD  Primary Care Provider:  Juan J Aldrich MD    SUBJECTIVE:     Principal Problem: Closed intertrochanteric fracture of left femur  Initial history of present illness: Omari Diallo is a 77 y.o. With PMHx significant for alcohol abuse.  She was admitted to the service of hospital medicine with LEFT hip fracture.  She experienced a trip and fall while walking in her house.   She reports after falling she experienced LEFT hip pain and was unable to bear weight on the LEFT leg. She was drinking wine as she does every night since "the 80s".  She reports she recently cut back to 3 glasses of wine nightly.  She has been in ETOH rehab before.  She states she does get "shaky" when she doesn't drink.  She denies any seizure history.  She denies any headache, neck pain, back pain, abdominal pain, chest pain, or any other complaints.  She is complaining of left hip pain that is accompanied by muscle spasms. She reports the pain is improved with IV morphine and oral Robaxin. She denies any medical history.  She denies any previous cardiac workup.  She smokes 1/4 ppd for ~50 years.    PMH/PSH/SH/FH/Meds: reviewed.    Symptoms/Review of Systems: Doing fine after hip surgery. No shortness of breath, cough, chest pain or headache, fever or abdominal pain.       Still constipated, getting miralax.      Diet:  Adequate intake.    Activity level: Normal.    Pain:  stable    OBJECTIVE:   Vital Signs (Most Recent):      Temp: 98 °F (36.7 °C) (06/03/17 1945)  Pulse: 80 (06/03/17 2100)  Resp: 18 (06/03/17 2100)  BP: (!) 152/68 (06/03/17 1945)  SpO2: 95 % (06/03/17 2100)       Vital Signs Range (Last 24H):  Temp:  [98 °F (36.7 °C)-98.8 °F (37.1 °C)]   Pulse:  [77-92]   Resp:  [18]   BP: (120-152)/(58-68)   SpO2:  [90 %-98 %] "     Weight: 54.5 kg (120 lb 2.4 oz)  Body mass index is 20.62 kg/m².    Intake/Output Summary (Last 24 hours) at 06/03/17 2307  Last data filed at 06/03/17 0736   Gross per 24 hour   Intake              120 ml   Output             2700 ml   Net            -2580 ml     Physical Examination:  Constitutional: She is oriented to person, place, and time. No distress.   Cachectic    HENT:   Head: Normocephalic and atraumatic.   Eyes: Conjunctivae and EOM are normal. Pupils are equal, round, and reactive to light.   Neck: Normal range of motion. Neck supple. No thyromegaly present.   Cardiovascular: Normal rate, regular rhythm, normal heart sounds and intact distal pulses.    Pulmonary/Chest: Effort normal and breath sounds normal. No respiratory distress.   Abdominal: Soft. Bowel sounds are normal. She exhibits no distension. There is no tenderness.   Musculoskeletal: Left hip dressing C/D/I.  Neurological: She is alert and oriented to person, place, and time. No cranial nerve deficit.   Skin: Skin is warm and dry. Capillary refill takes less than 2 seconds. No pallor.     CBC:    Recent Labs  Lab 06/01/17  0539 06/02/17  0535 06/03/17  0550   WBC 14.80* 14.10* 10.70   RBC 3.06* 2.91* 2.69*   HGB 10.1* 9.7* 9.0*   HCT 30.1* 28.5* 26.5*    261 271   MCV 99* 98 98   MCH 33.0* 33.4* 33.4*   MCHC 33.5 34.1 34.0   BMP    Recent Labs  Lab 06/01/17  0539 06/02/17  0535 06/03/17  0550   * 101 94    139 141   K 3.5 2.8* 2.6*    103 103   CO2 27 28 29   BUN 4* 3* 4*   CREATININE 0.6 0.5 0.5   CALCIUM 8.5* 8.8 8.7      Diagnostic Results:  Microbiology Results (last 7 days)     Procedure Component Value Units Date/Time    Urine culture [[id number]]  (Susceptibility) Collected:  05/31/17 0944    Order Status:  Completed Specimen:  Urine from Urine, Catheterized Updated:  06/03/17 0729     Urine Culture, Routine --     ESCHERICHIA COLI  >100,000 cfu/ml         Left femur x-ray: Status post ORIF  intertrochanteric fracture left femur with a long intramedullary nail and pin through the nail into the femoral head with excellent apposition and angulation.    X-ray pelvis: Intertrochanteric fracture left femur.  Diffuse osteoporosis.  Degenerative disc disease at L4-5.    Left femur fracture: Difficult to visualize slightly angulated intertrochanteric fracture of the left hip.    CXR: Atherosclerosis otherwise negative chest.    Assessment/Plan:     * Closed intertrochanteric fracture of left femur s/p left intra-medullary hip nail placement     Continue to follow Orthopedic recommendations.  Needs aggressive incentive spirometry.  Follow hemoglobin and hematocrit closely.  Pain control with PO narcotics and antiemetics as needed.  Physical therapy as per Orthopedics protocol with fall precautions.          Alcohol dependence with intoxication     Maintain fall and seizure precautions. Continue IVF hydration- Banana bag x 1-- follow ETOH. Nursing to check CIWA-AR, monitoring closely for DTs and treat with PRN Benzodiazepines as needed. Use IV anti-emetics as needed.  Follow serum lytes. Discussed dangers of alcohol abuse particularly post-operatively following hip repair.  Daily MVI, thiamine, folic acid.      Hypokalemia  Replete KCl. Follow BMP.     Fall on same level from tripping as cause of accidental injury     Resulting in hip fx.  Fall precautions in place.          UTI- E. Coli sp     Follow urine culture.  Continue IV Ceftriaxone.       Dependence on nicotine from cigarettes     Health hazards associated with cigarette smoking were reviewed with patient and cessation was encouraged. Nicotine replacement and counseling options were discussed.       Discussed with patient, if patient does better with PT tomorrow; DC home with home PT otherwise SNF appropriate.     VTE Risk Mitigation         Ordered     enoxaparin injection 40 mg  Every 24 hours (non-standard times)     Route:  Subcutaneous         05/31/17 2111     Medium Risk of VTE  Once      05/31/17 2224     Place BRYANT hose  Until discontinued      05/31/17 2224     Place sequential compression device  Until discontinued      05/31/17 2224     Place sequential compression device  Until discontinued      05/31/17 0105     Place BRYANT hose  Until discontinued      05/31/17 0105     Reason for No Pharmacological VTE Prophylaxis  Once      05/31/17 0105        Manjeet Roe MD  Department of Hospital Medicine   [facility]

## 2017-06-04 NOTE — PLAN OF CARE
Problem: Physical Therapy Goal  Goal: Physical Therapy Goal  Goals to be met by: 2017     Patient will increase functional independence with mobility by performin). Supine to sit with Contact Guard Assistance  2). Sit to supine with Contact Guard Assistance  3). Sit to stand transfer with Stand-by Assistance  4). Gait  x > 50 feet with Stand-by Assistance using Rolling Walker.      Outcome: Ongoing (interventions implemented as appropriate)  Ambulated 40' with rw and Min A, WBAT LLE.

## 2017-06-05 LAB
ANION GAP SERPL CALC-SCNC: 9 MMOL/L
BASOPHILS # BLD AUTO: 0 K/UL
BASOPHILS NFR BLD: 0.5 %
BUN SERPL-MCNC: 6 MG/DL
CALCIUM SERPL-MCNC: 9.1 MG/DL
CHLORIDE SERPL-SCNC: 106 MMOL/L
CO2 SERPL-SCNC: 23 MMOL/L
CREAT SERPL-MCNC: 0.5 MG/DL
DIFFERENTIAL METHOD: ABNORMAL
EOSINOPHIL # BLD AUTO: 0.2 K/UL
EOSINOPHIL NFR BLD: 2.8 %
ERYTHROCYTE [DISTWIDTH] IN BLOOD BY AUTOMATED COUNT: 13.9 %
EST. GFR  (AFRICAN AMERICAN): >60 ML/MIN/1.73 M^2
EST. GFR  (NON AFRICAN AMERICAN): >60 ML/MIN/1.73 M^2
GLUCOSE SERPL-MCNC: 92 MG/DL
HCT VFR BLD AUTO: 27.8 %
HGB BLD-MCNC: 9.5 G/DL
LYMPHOCYTES # BLD AUTO: 2 K/UL
LYMPHOCYTES NFR BLD: 22.3 %
MCH RBC QN AUTO: 33.5 PG
MCHC RBC AUTO-ENTMCNC: 34.2 %
MCV RBC AUTO: 98 FL
MONOCYTES # BLD AUTO: 1.2 K/UL
MONOCYTES NFR BLD: 13.1 %
NEUTROPHILS # BLD AUTO: 5.5 K/UL
NEUTROPHILS NFR BLD: 61.3 %
PLATELET # BLD AUTO: 354 K/UL
PMV BLD AUTO: 7.5 FL
POTASSIUM SERPL-SCNC: 4.4 MMOL/L
RBC # BLD AUTO: 2.84 M/UL
SODIUM SERPL-SCNC: 138 MMOL/L
WBC # BLD AUTO: 8.9 K/UL

## 2017-06-05 PROCEDURE — 63600175 PHARM REV CODE 636 W HCPCS: Performed by: ORTHOPAEDIC SURGERY

## 2017-06-05 PROCEDURE — 94640 AIRWAY INHALATION TREATMENT: CPT

## 2017-06-05 PROCEDURE — 99900035 HC TECH TIME PER 15 MIN (STAT)

## 2017-06-05 PROCEDURE — 97110 THERAPEUTIC EXERCISES: CPT

## 2017-06-05 PROCEDURE — 80048 BASIC METABOLIC PNL TOTAL CA: CPT

## 2017-06-05 PROCEDURE — 86580 TB INTRADERMAL TEST: CPT | Performed by: HOSPITALIST

## 2017-06-05 PROCEDURE — 97116 GAIT TRAINING THERAPY: CPT

## 2017-06-05 PROCEDURE — 94761 N-INVAS EAR/PLS OXIMETRY MLT: CPT

## 2017-06-05 PROCEDURE — 99232 SBSQ HOSP IP/OBS MODERATE 35: CPT | Mod: ,,, | Performed by: INTERNAL MEDICINE

## 2017-06-05 PROCEDURE — 85025 COMPLETE CBC W/AUTO DIFF WBC: CPT

## 2017-06-05 PROCEDURE — 11000001 HC ACUTE MED/SURG PRIVATE ROOM

## 2017-06-05 PROCEDURE — 63600175 PHARM REV CODE 636 W HCPCS: Performed by: HOSPITALIST

## 2017-06-05 PROCEDURE — 36415 COLL VENOUS BLD VENIPUNCTURE: CPT

## 2017-06-05 PROCEDURE — 25000003 PHARM REV CODE 250: Performed by: NURSE PRACTITIONER

## 2017-06-05 RX ADMIN — ENOXAPARIN SODIUM 40 MG: 100 INJECTION SUBCUTANEOUS at 08:06

## 2017-06-05 RX ADMIN — TUBERCULIN PURIFIED PROTEIN DERIVATIVE 5 UNITS: 5 INJECTION, SOLUTION INTRADERMAL at 12:06

## 2017-06-05 RX ADMIN — PANTOPRAZOLE SODIUM 40 MG: 40 TABLET, DELAYED RELEASE ORAL at 08:06

## 2017-06-05 RX ADMIN — FLUTICASONE FUROATE AND VILANTEROL TRIFENATATE 1 PUFF: 100; 25 POWDER RESPIRATORY (INHALATION) at 07:06

## 2017-06-05 NOTE — PLAN OF CARE
Problem: Physical Therapy Goal  Goal: Physical Therapy Goal  Goals to be met by: 2017     Patient will increase functional independence with mobility by performin). Supine to sit with Contact Guard Assistance  2). Sit to supine with Contact Guard Assistance  3). Sit to stand transfer with Stand-by Assistance  4). Gait  x > 50 feet with Stand-by Assistance using Rolling Walker.      Outcome: Ongoing (interventions implemented as appropriate)  Ambulated 50' with rw and Min A , WBAT LLE.

## 2017-06-05 NOTE — PLAN OF CARE
"Problem: Patient Care Overview  Goal: Plan of Care Review  Outcome: Ongoing (interventions implemented as appropriate)  Pt assisted to and from the bedside commode as needed. Pt does call for assistance with transfers. Pt tolerates transfers well. Pt refused to take her PPD as ordered. Pt became very upset when explanation of the reason pt needed the PPD for possible SNF at her discharge. Pt states, "I should not have to take a PPD when all the illegal immigrants keep coming into our country and they don't have to get vaccines and they are not tested for diseases." Attempts to redirect pt not successful. Call light in easy reach.       "

## 2017-06-05 NOTE — PT/OT/SLP PROGRESS
Physical Therapy  Treatment    Omari Diallo   MRN: [id number]   Admitting Diagnosis: Closed intertrochanteric fracture of left femur    PT Received On: 06/05/17  PT Start Time: 0943     PT Stop Time: 1000    PT Total Time (min): 17 min       Billable Minutes:  Gait [id number] and Therapeutic Exercise 8min    Treatment Type: Treatment  PT/PTA: PTA     PTA Visit Number: 4       General Precautions: Standard, fall  Orthopedic Precautions: LLE weight bearing as tolerated   Braces: N/A         Subjective:  Communicated with nurse Hendricks prior to session.  Agreed to ambulate.    Pain/Comfort  Pain Rating 1: 3/10  Location - Side 1: Left  Location 1: hip  Pain Addressed 1: Reposition, Nurse notified, Other (see comments) (reports pain only when stepping.)    Objective:   Patient found with: telemetry    Functional Mobility:  Bed Mobility:        Transfers:  Sit <> Stand Assistance: Minimum Assistance  Sit <> Stand Assistive Device: Rolling Walker    Gait:   Gait Distance: 50'  Assistance 1: Minimum assistance  Gait Assistive Device: Rolling walker  Gait Pattern: 3-point gait  Gait Deviation(s): decreased otoniel, decreased velocity of limb motion, decreased step length, decreased weight-shifting ability    Stairs:      Balance:   Static Sit: FAIR: Maintains without assist, but unable to take any challenges   Dynamic Sit: FAIR+: Maintains balance through MINIMAL excursions of active trunk motion  Static Stand: FAIR: Maintains without assist but unable to take challenges  Dynamic stand: POOR: N/A     Therapeutic Activities and Exercises:  Seated in chair.  Sit to stand with Min A. Ambulated 50' with rw and Min A with verbal cues for technique.  AROM BLE's at 10 reps each : TKE's, Hip flexion, AP's.     AM-PAC 6 CLICK MOBILITY  How much help from another person does this patient currently need?   1 = Unable, Total/Dependent Assistance  2 = A lot, Maximum/Moderate Assistance  3 = A little, Minimum/Contact Guard/Supervision  4 =  None, Modified [place]/Independent         AM-PAC Raw Score CMS G-Code Modifier Level of Impairment Assistance   6 % Total / Unable   7 - 9 CM 80 - 100% Maximal Assist   10 - 14 CL 60 - 80% Moderate Assist   15 - 19 CK 40 - 60% Moderate Assist   20 - 22 CJ 20 - 40% Minimal Assist   23 CI 1-20% SBA / CGA   24 CH 0% Independent/ Mod I     Patient left up in chair with all lines intact, call button in reach, nurse Ruthie notified and spouse present.    Assessment:  Omari Diallo is a 77 y.o. female with a medical diagnosis of Closed intertrochanteric fracture of left femur and presents with limited endurance, L hip pain with movement..    Rehab identified problem list/impairments: Rehab identified problem list/impairments: weakness, impaired endurance, gait instability, decreased lower extremity function    Rehab potential is good.    Activity tolerance: Good    Discharge recommendations: Discharge Facility/Level Of Care Needs: home health PT, nursing facility, skilled     Barriers to discharge:      Equipment recommendations:       GOALS:    Physical Therapy Goals        Problem: Physical Therapy Goal    Goal Priority Disciplines Outcome Goal Variances Interventions   Physical Therapy Goal     PT/OT, PT Ongoing (interventions implemented as appropriate)     Description:  Goals to be met by: 2017     Patient will increase functional independence with mobility by performin). Supine to sit with Contact Guard Assistance  2). Sit to supine with Contact Guard Assistance  3). Sit to stand transfer with Stand-by Assistance  4). Gait  x > 50 feet with Stand-by Assistance using Rolling Walker.                       PLAN:    Patient to be seen BID (daily Sat and Sun)  to address the above listed problems via gait training, therapeutic activities, therapeutic exercises  Plan of Care expires: 17  Plan of Care reviewed with: patient, spouse         Kalyani Campo PTA  2017

## 2017-06-05 NOTE — PROGRESS NOTES
I completed the LOCET assessment with Rosa at [phone]. I faxed the signed and completed PASRR to [facility] . I will follow up with obtaining pts emailed [phone]. Tracy Hernandez LMSW

## 2017-06-05 NOTE — PROGRESS NOTES
I attempted to complete LOCET assessment at [phone] opt 3 however I had to leave a message for a return call. Tracy Hernandez LMSW

## 2017-06-05 NOTE — PROGRESS NOTES
"Progress Note  Hospital Medicine  Patient Name:Omari Diallo  MRN:  [id number]  Patient Class: IP- Inpatient  Admit Date: 5/30/2017  Length of Stay: 6 days  Expected Discharge Date:   Attending Physician: New Winn MD  Primary Care Provider:  Juan J Aldrich MD    SUBJECTIVE:     Principal Problem: Closed intertrochanteric fracture of left femur  Initial history of present illness: Omari Diallo is a 77 y.o. With PMHx significant for alcohol abuse.  She was admitted to the service of hospital medicine with LEFT hip fracture.  She experienced a trip and fall while walking in her house.   She reports after falling she experienced LEFT hip pain and was unable to bear weight on the LEFT leg. She was drinking wine as she does every night since "the 80s".  She reports she recently cut back to 3 glasses of wine nightly.  She has been in ETOH rehab before.  She states she does get "shaky" when she doesn't drink.  She denies any seizure history.  She denies any headache, neck pain, back pain, abdominal pain, chest pain, or any other complaints.  She is complaining of left hip pain that is accompanied by muscle spasms. She reports the pain is improved with IV morphine and oral Robaxin. She denies any medical history.  She denies any previous cardiac workup.  She smokes 1/4 ppd for ~50 years.    PMH/PSH/SH/FH/Meds: reviewed.    Interval history: awaiting SNF placement    Symptoms/Review of Systems: Doing fine after hip surgery. No shortness of breath, cough, chest pain or headache, fever or abdominal pain.       Still constipated, getting miralax.  Having flatus today.     Diet:  Adequate intake.    Activity level: Normal.    Pain:  stable    OBJECTIVE:   Vital Signs (Most Recent):      Temp: 98.5 °F (36.9 °C) (06/05/17 0751)  Pulse: 77 (06/05/17 0751)  Resp: 20 (06/05/17 0751)  BP: (!) 143/68 (06/05/17 0751)  SpO2: 97 % (06/05/17 0751)       Vital Signs Range (Last 24H):  Temp:  [97.9 °F (36.6 °C)-98.6 °F (37 °C)]   Pulse:  " [77-88]   Resp:  [18-20]   BP: (133-154)/(59-70)   SpO2:  [94 %-97 %]     Weight: 54.5 kg (120 lb 2.4 oz)  Body mass index is 20.62 kg/m².    Intake/Output Summary (Last 24 hours) at 06/05/17 0922  Last data filed at 06/05/17 0535   Gross per 24 hour   Intake          3519.58 ml   Output                0 ml   Net          3519.58 ml     Physical Examination:  Constitutional: She is oriented to person, place, and time. No distress.   Cachectic    HENT:   Head: Normocephalic and atraumatic.   Eyes: Conjunctivae and EOM are normal. Pupils are equal, round, and reactive to light.   Neck: Normal range of motion. Neck supple. No thyromegaly present.   Cardiovascular: Normal rate, regular rhythm, normal heart sounds and intact distal pulses.    Pulmonary/Chest: Effort normal and breath sounds normal. No respiratory distress.   Abdominal: Soft. Bowel sounds are normal. She exhibits no distension. There is no tenderness.   Musculoskeletal: Left hip dressing C/D/I.  Neurological: She is alert and oriented to person, place, and time. No cranial nerve deficit.   Skin: Skin is warm and dry. Capillary refill takes less than 2 seconds. No pallor.     CBC:    Recent Labs  Lab 06/03/17  0550 06/04/17  0536 06/05/17 0617   WBC 10.70 8.40 8.90   RBC 2.69* 2.77* 2.84*   HGB 9.0* 9.3* 9.5*   HCT 26.5* 26.9* 27.8*    313 354*   MCV 98 97 98   MCH 33.4* 33.6* 33.5*   MCHC 34.0 34.5 34.2   BMP    Recent Labs  Lab 06/03/17  0550 06/04/17  0536 06/05/17  0617   GLU 94 97 92    141 138   K 2.6* 3.2* 4.4    105 106   CO2 29 29 23   BUN 4* 5* 6*   CREATININE 0.5 0.5 0.5   CALCIUM 8.7 8.7 9.1   MG  --  2.2  --       Diagnostic Results:  Microbiology Results (last 7 days)     Procedure Component Value Units Date/Time    Urine culture [[id number]]  (Susceptibility) Collected:  05/31/17 0944    Order Status:  Completed Specimen:  Urine from Urine, Catheterized Updated:  06/03/17 0729     Urine Culture, Routine --     ESCHERICHIA  COLI  >100,000 cfu/ml         Left femur x-ray: Status post ORIF intertrochanteric fracture left femur with a long intramedullary nail and pin through the nail into the femoral head with excellent apposition and angulation.    X-ray pelvis: Intertrochanteric fracture left femur.  Diffuse osteoporosis.  Degenerative disc disease at L4-5.    Left femur fracture: Difficult to visualize slightly angulated intertrochanteric fracture of the left hip.    CXR: Atherosclerosis otherwise negative chest.    Assessment/Plan:     * Closed intertrochanteric fracture of left femur s/p left intra-medullary hip nail placement     Continue to follow Orthopedic recommendations.  Needs aggressive incentive spirometry.  Follow hemoglobin and hematocrit closely.  Pain control with PO narcotics and antiemetics as needed.  Physical therapy as per Orthopedics protocol with fall precautions.  Await SNF placement.         Alcohol dependence with intoxication     Maintain fall and seizure precautions. Daily MVI, thiamine, folic acid.      Hypokalemia - better  Follow BMP.     Fall on same level from tripping as cause of accidental injury     Resulting in hip fx.  Fall precautions in place.          UTI- E. Coli sp     Resolved, completed course antibiotics.       Dependence on nicotine from cigarettes     Health hazards associated with cigarette smoking were reviewed with patient and cessation was encouraged. Nicotine replacement and counseling options were discussed.         Await SNF placement. Discussed with .   VTE Risk Mitigation         Ordered     enoxaparin injection 40 mg  Every 24 hours (non-standard times)     Route:  Subcutaneous        05/31/17 2111     Medium Risk of VTE  Once      05/31/17 2224     Place BRYANT hose  Until discontinued      05/31/17 2224     Place sequential compression device  Until discontinued      05/31/17 2224     Place sequential compression device  Until discontinued      05/31/17 0105     Place  BRYANT hose  Until discontinued      05/31/17 0105     Reason for No Pharmacological VTE Prophylaxis  Once      05/31/17 0105        New Winn MD  Department of Hospital Medicine   [facility]

## 2017-06-05 NOTE — PROGRESS NOTES
"Progress Note  Hospital Medicine  Patient Name:Omari Diallo  MRN:  [id number]  Patient Class: IP- Inpatient  Admit Date: 5/30/2017  Length of Stay: 5 days  Expected Discharge Date:   Attending Physician: Manjeet Roe MD  Primary Care Provider:  Juan J Aldrich MD    SUBJECTIVE:     Principal Problem: Closed intertrochanteric fracture of left femur  Initial history of present illness: Omari Diallo is a 77 y.o. With PMHx significant for alcohol abuse.  She was admitted to the service of hospital medicine with LEFT hip fracture.  She experienced a trip and fall while walking in her house.   She reports after falling she experienced LEFT hip pain and was unable to bear weight on the LEFT leg. She was drinking wine as she does every night since "the 80s".  She reports she recently cut back to 3 glasses of wine nightly.  She has been in ETOH rehab before.  She states she does get "shaky" when she doesn't drink.  She denies any seizure history.  She denies any headache, neck pain, back pain, abdominal pain, chest pain, or any other complaints.  She is complaining of left hip pain that is accompanied by muscle spasms. She reports the pain is improved with IV morphine and oral Robaxin. She denies any medical history.  She denies any previous cardiac workup.  She smokes 1/4 ppd for ~50 years.    PMH/PSH/SH/FH/Meds: reviewed.    Interval history: awaiting SNF placement    Symptoms/Review of Systems: Doing fine after hip surgery. No shortness of breath, cough, chest pain or headache, fever or abdominal pain.       Still constipated, getting miralax.  Having flatus today.     Diet:  Adequate intake.    Activity level: Normal.    Pain:  stable    OBJECTIVE:   Vital Signs (Most Recent):      Temp: 98 °F (36.7 °C) (06/04/17 2000)  Pulse: 82 (06/04/17 2019)  Resp: 18 (06/04/17 2019)  BP: (!) 147/67 (06/04/17 2000)  SpO2: 95 % (06/04/17 2019)       Vital Signs Range (Last 24H):  Temp:  [97.9 °F (36.6 °C)-98.6 °F (37 °C)]   Pulse:  [68-88] "   Resp:  [18-19]   BP: (124-154)/(63-81)   SpO2:  [94 %-97 %]     Weight: 54.5 kg (120 lb 2.4 oz)  Body mass index is 20.62 kg/m².    Intake/Output Summary (Last 24 hours) at 06/04/17 2153  Last data filed at 06/04/17 1900   Gross per 24 hour   Intake          9506.67 ml   Output                0 ml   Net          9506.67 ml     Physical Examination:  Constitutional: She is oriented to person, place, and time. No distress.   Cachectic    HENT:   Head: Normocephalic and atraumatic.   Eyes: Conjunctivae and EOM are normal. Pupils are equal, round, and reactive to light.   Neck: Normal range of motion. Neck supple. No thyromegaly present.   Cardiovascular: Normal rate, regular rhythm, normal heart sounds and intact distal pulses.    Pulmonary/Chest: Effort normal and breath sounds normal. No respiratory distress.   Abdominal: Soft. Bowel sounds are normal. She exhibits no distension. There is no tenderness.   Musculoskeletal: Left hip dressing C/D/I.  Neurological: She is alert and oriented to person, place, and time. No cranial nerve deficit.   Skin: Skin is warm and dry. Capillary refill takes less than 2 seconds. No pallor.     CBC:    Recent Labs  Lab 06/02/17  0535 06/03/17  0550 06/04/17  0536   WBC 14.10* 10.70 8.40   RBC 2.91* 2.69* 2.77*   HGB 9.7* 9.0* 9.3*   HCT 28.5* 26.5* 26.9*    271 313   MCV 98 98 97   MCH 33.4* 33.4* 33.6*   MCHC 34.1 34.0 34.5   BMP    Recent Labs  Lab 06/02/17  0535 06/03/17  0550 06/04/17  0536    94 97    141 141   K 2.8* 2.6* 3.2*    103 105   CO2 28 29 29   BUN 3* 4* 5*   CREATININE 0.5 0.5 0.5   CALCIUM 8.8 8.7 8.7   MG  --   --  2.2      Diagnostic Results:  Microbiology Results (last 7 days)     Procedure Component Value Units Date/Time    Urine culture [[id number]]  (Susceptibility) Collected:  05/31/17 0944    Order Status:  Completed Specimen:  Urine from Urine, Catheterized Updated:  06/03/17 0729     Urine Culture, Routine --     ESCHERICHIA  COLI  >100,000 cfu/ml         Left femur x-ray: Status post ORIF intertrochanteric fracture left femur with a long intramedullary nail and pin through the nail into the femoral head with excellent apposition and angulation.    X-ray pelvis: Intertrochanteric fracture left femur.  Diffuse osteoporosis.  Degenerative disc disease at L4-5.    Left femur fracture: Difficult to visualize slightly angulated intertrochanteric fracture of the left hip.    CXR: Atherosclerosis otherwise negative chest.    Assessment/Plan:     * Closed intertrochanteric fracture of left femur s/p left intra-medullary hip nail placement     Continue to follow Orthopedic recommendations.  Needs aggressive incentive spirometry.  Follow hemoglobin and hematocrit closely.  Pain control with PO narcotics and antiemetics as needed.  Physical therapy as per Orthopedics protocol with fall precautions.       Case management consult for SNF consult for rehabilitation.         Alcohol dependence with intoxication     Maintain fall and seizure precautions. Continue IVF hydration- Banana bag x 1-- follow ETOH. Nursing to check CIWA-AR, monitoring closely for DTs and treat with PRN Benzodiazepines as needed. Use IV anti-emetics as needed.  Follow serum lytes. Discussed dangers of alcohol abuse particularly post-operatively following hip repair.  Daily MVI, thiamine, folic acid.      Hypokalemia  Replete KCl. Follow BMP.     Fall on same level from tripping as cause of accidental injury     Resulting in hip fx.  Fall precautions in place.          UTI- E. Coli sp     Resolved, completed course antibiotics.       Dependence on nicotine from cigarettes     Health hazards associated with cigarette smoking were reviewed with patient and cessation was encouraged. Nicotine replacement and counseling options were discussed.       Discussed with patient, if patient does better with PT tomorrow; DC home with home PT otherwise SNF appropriate.     VTE Risk Mitigation          Ordered     enoxaparin injection 40 mg  Every 24 hours (non-standard times)     Route:  Subcutaneous        05/31/17 2111     Medium Risk of VTE  Once      05/31/17 2224     Place BRYANT hose  Until discontinued      05/31/17 2224     Place sequential compression device  Until discontinued      05/31/17 2224     Place sequential compression device  Until discontinued      05/31/17 0105     Place BRYANT hose  Until discontinued      05/31/17 0105     Reason for No Pharmacological VTE Prophylaxis  Once      05/31/17 0105        Manjeet Roe MD  Department of Hospital Medicine   [facility]

## 2017-06-05 NOTE — PROGRESS NOTES
I received pts approved 142 via email and placed the original in the pts blue folder.  Tracy Hernandez LMSW

## 2017-06-05 NOTE — PROGRESS NOTES
I met with the pt and his wife at bedside. They would like to choose [facility], [facility] and [facility] as Skilled options. I discussed the SNF protocol including obtaining [employer] authorization . The pts spouse is going to tour [facility] bc he does not think the one in [place] is very nice. They inquired about the new facility in [facility]. However they do not accept [employer] insurance. They had a son who was cared for at [facility] several years ago and are familiar with [facility]'s location. 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 He would like to be here when the facility representatives visit his wife. I told him that I would put that information on the face sheet however since it is Monday morning , I am unable to verify any one else's schedule. I sent a 3 day packet, current meds and pt notes to the three facilities via [employer]. Tracy Hernandez LMSW

## 2017-06-05 NOTE — PT/OT/SLP PROGRESS
Physical Therapy  Treatment    Omari Diallo   MRN: [id number]   Admitting Diagnosis: Closed intertrochanteric fracture of left femur    PT Received On: 06/05/17  PT Start Time: 1342     PT Stop Time: 1355    PT Total Time (min): 13 min       Billable Minutes:  Gait [id number] and Therapeutic Exercise 5min    Treatment Type: Treatment  PT/PTA: PTA     PTA Visit Number: 4       General Precautions: Standard, fall  Orthopedic Precautions: LLE weight bearing as tolerated   Braces: N/A         Subjective:  Communicated with nurse Hendricks prior to session.  Agreed to ambulate.    Pain/Comfort  Pain Rating 1: 5/10  Location - Side 1: Left  Location - Orientation 1: generalized  Location 1: hip  Pain Addressed 1: Reposition, Nurse notified    Objective:   Patient found with: telemetry    Functional Mobility:  Bed Mobility:        Transfers:  Sit <> Stand Assistance: Minimum Assistance  Sit <> Stand Assistive Device: Rolling Walker    Gait:   Gait Distance: 30'  Assistance 1: Minimum assistance  Gait Assistive Device: Rolling walker  Gait Pattern: 3-point gait  Gait Deviation(s): decreased otoniel, decreased velocity of limb motion, decreased step length, decreased weight-shifting ability    Stairs:      Balance:   Static Sit: FAIR: Maintains without assist, but unable to take any challenges   Dynamic Sit: FAIR+: Maintains balance through MINIMAL excursions of active trunk motion  Static Stand: FAIR: Maintains without assist but unable to take challenges  Dynamic stand: POOR: N/A     Therapeutic Activities and Exercises:  Seated in chair.  Ambulated 30' slowly due to increased LLE discomfort following sitting. Performed LE exercises seated in chair at 10 reps each : TKE's, Hip flexion, AP's.      AM-PAC 6 CLICK MOBILITY  How much help from another person does this patient currently need?   1 = Unable, Total/Dependent Assistance  2 = A lot, Maximum/Moderate Assistance  3 = A little, Minimum/Contact Guard/Supervision  4 = None,  Modified [place]/Independent         AM-PAC Raw Score CMS G-Code Modifier Level of Impairment Assistance   6 % Total / Unable   7 - 9 CM 80 - 100% Maximal Assist   10 - 14 CL 60 - 80% Moderate Assist   15 - 19 CK 40 - 60% Moderate Assist   20 - 22 CJ 20 - 40% Minimal Assist   23 CI 1-20% SBA / CGA   24 CH 0% Independent/ Mod I     Patient left up in chair with all lines intact, call button in reach and nurse Ruthie notified.    Assessment:  Omari Diallo is a 77 y.o. female with a medical diagnosis of Closed intertrochanteric fracture of left femur and presents with limited endurance,pain..    Rehab identified problem list/impairments: Rehab identified problem list/impairments: weakness, impaired endurance, gait instability, decreased lower extremity function    Rehab potential is good.    Activity tolerance: Good    Discharge recommendations: Discharge Facility/Level Of Care Needs: home health PT, nursing facility, skilled     Barriers to discharge:      Equipment recommendations:       GOALS:    Physical Therapy Goals        Problem: Physical Therapy Goal    Goal Priority Disciplines Outcome Goal Variances Interventions   Physical Therapy Goal     PT/OT, PT Ongoing (interventions implemented as appropriate)     Description:  Goals to be met by: 2017     Patient will increase functional independence with mobility by performin). Supine to sit with Contact Guard Assistance  2). Sit to supine with Contact Guard Assistance  3). Sit to stand transfer with Stand-by Assistance  4). Gait  x > 50 feet with Stand-by Assistance using Rolling Walker.                       PLAN:    Patient to be seen BID (daily Sat and Sun)  to address the above listed problems via gait training, therapeutic activities, therapeutic exercises  Plan of Care expires: 17  Plan of Care reviewed with: patient         Kalyani Campo, STEVO  2017

## 2017-06-05 NOTE — PROGRESS NOTES
06/05/17 0751   Patient Assessment/Suction   Level of Consciousness (AVPU) alert   All Lung Fields Breath Sounds diminished   Cough Type none   PRE-TX-O2-ETCO2   O2 Device (Oxygen Therapy) room air   SpO2 97 %   Pulse Oximetry Type Intermittent   $ Pulse Oximetry - Multiple Charge Pulse Oximetry - Multiple   Pulse 77   Resp 20   Positioning Sitting in chair   Aerosol Therapy   $ Aerosol Therapy Charges PRN treatment not required   Inhaler   $ Inhaler Charges MDI (Metered Dose Inahler) Treatment   Respiratory Treatment Status given   SVN/Inhaler Treatment Route mouthpiece   Patient Tolerance good

## 2017-06-06 LAB
ANION GAP SERPL CALC-SCNC: 9 MMOL/L
BASOPHILS # BLD AUTO: 0 K/UL
BASOPHILS NFR BLD: 0.3 %
BUN SERPL-MCNC: 7 MG/DL
CALCIUM SERPL-MCNC: 9 MG/DL
CHLORIDE SERPL-SCNC: 105 MMOL/L
CO2 SERPL-SCNC: 25 MMOL/L
CREAT SERPL-MCNC: 0.5 MG/DL
DIFFERENTIAL METHOD: ABNORMAL
EOSINOPHIL # BLD AUTO: 0.2 K/UL
EOSINOPHIL NFR BLD: 2.6 %
ERYTHROCYTE [DISTWIDTH] IN BLOOD BY AUTOMATED COUNT: 13.9 %
EST. GFR  (AFRICAN AMERICAN): >60 ML/MIN/1.73 M^2
EST. GFR  (NON AFRICAN AMERICAN): >60 ML/MIN/1.73 M^2
GLUCOSE SERPL-MCNC: 101 MG/DL
HCT VFR BLD AUTO: 26.8 %
HGB BLD-MCNC: 9.1 G/DL
LYMPHOCYTES # BLD AUTO: 1.6 K/UL
LYMPHOCYTES NFR BLD: 19.2 %
MCH RBC QN AUTO: 33.6 PG
MCHC RBC AUTO-ENTMCNC: 34.1 %
MCV RBC AUTO: 99 FL
MONOCYTES # BLD AUTO: 1.3 K/UL
MONOCYTES NFR BLD: 15.5 %
NEUTROPHILS # BLD AUTO: 5.1 K/UL
NEUTROPHILS NFR BLD: 62.4 %
PLATELET # BLD AUTO: 381 K/UL
PMV BLD AUTO: 7.3 FL
POTASSIUM SERPL-SCNC: 3.3 MMOL/L
RBC # BLD AUTO: 2.72 M/UL
SODIUM SERPL-SCNC: 139 MMOL/L
WBC # BLD AUTO: 8.1 K/UL

## 2017-06-06 PROCEDURE — 63600175 PHARM REV CODE 636 W HCPCS: Performed by: ORTHOPAEDIC SURGERY

## 2017-06-06 PROCEDURE — 97116 GAIT TRAINING THERAPY: CPT

## 2017-06-06 PROCEDURE — 36415 COLL VENOUS BLD VENIPUNCTURE: CPT

## 2017-06-06 PROCEDURE — 94640 AIRWAY INHALATION TREATMENT: CPT

## 2017-06-06 PROCEDURE — 25000003 PHARM REV CODE 250: Performed by: INTERNAL MEDICINE

## 2017-06-06 PROCEDURE — 80048 BASIC METABOLIC PNL TOTAL CA: CPT

## 2017-06-06 PROCEDURE — 11000001 HC ACUTE MED/SURG PRIVATE ROOM

## 2017-06-06 PROCEDURE — 97535 SELF CARE MNGMENT TRAINING: CPT

## 2017-06-06 PROCEDURE — 25000003 PHARM REV CODE 250: Performed by: NURSE PRACTITIONER

## 2017-06-06 PROCEDURE — 99232 SBSQ HOSP IP/OBS MODERATE 35: CPT | Mod: ,,, | Performed by: INTERNAL MEDICINE

## 2017-06-06 PROCEDURE — 85025 COMPLETE CBC W/AUTO DIFF WBC: CPT

## 2017-06-06 PROCEDURE — 94761 N-INVAS EAR/PLS OXIMETRY MLT: CPT

## 2017-06-06 PROCEDURE — 97110 THERAPEUTIC EXERCISES: CPT

## 2017-06-06 PROCEDURE — 99900035 HC TECH TIME PER 15 MIN (STAT)

## 2017-06-06 RX ORDER — CETIRIZINE HYDROCHLORIDE 10 MG/1
10 TABLET ORAL DAILY PRN
Status: DISCONTINUED | OUTPATIENT
Start: 2017-06-06 | End: 2017-06-07 | Stop reason: HOSPADM

## 2017-06-06 RX ORDER — POTASSIUM CHLORIDE 20 MEQ/1
40 TABLET, EXTENDED RELEASE ORAL ONCE
Status: COMPLETED | OUTPATIENT
Start: 2017-06-06 | End: 2017-06-06

## 2017-06-06 RX ORDER — THIAMINE HCL 100 MG
100 TABLET ORAL DAILY
Status: DISCONTINUED | OUTPATIENT
Start: 2017-06-06 | End: 2017-06-07 | Stop reason: HOSPADM

## 2017-06-06 RX ORDER — FLUTICASONE PROPIONATE 50 MCG
2 SPRAY, SUSPENSION (ML) NASAL 2 TIMES DAILY
Status: DISCONTINUED | OUTPATIENT
Start: 2017-06-06 | End: 2017-06-07 | Stop reason: HOSPADM

## 2017-06-06 RX ORDER — SYRING-NEEDL,DISP,INSUL,0.3 ML 29 G X1/2"
296 SYRINGE, EMPTY DISPOSABLE MISCELLANEOUS ONCE
Status: COMPLETED | OUTPATIENT
Start: 2017-06-06 | End: 2017-06-06

## 2017-06-06 RX ADMIN — FLUTICASONE FUROATE AND VILANTEROL TRIFENATATE 1 PUFF: 100; 25 POWDER RESPIRATORY (INHALATION) at 07:06

## 2017-06-06 RX ADMIN — FLUTICASONE PROPIONATE 2 SPRAY: 50 SPRAY, METERED NASAL at 04:06

## 2017-06-06 RX ADMIN — CETIRIZINE HYDROCHLORIDE 10 MG: 10 TABLET, FILM COATED ORAL at 01:06

## 2017-06-06 RX ADMIN — FLUTICASONE PROPIONATE 2 SPRAY: 50 SPRAY, METERED NASAL at 09:06

## 2017-06-06 RX ADMIN — POTASSIUM CHLORIDE 40 MEQ: 20 TABLET, EXTENDED RELEASE ORAL at 12:06

## 2017-06-06 RX ADMIN — ENOXAPARIN SODIUM 40 MG: 100 INJECTION SUBCUTANEOUS at 08:06

## 2017-06-06 RX ADMIN — MAGNESIUM CITRATE 296 ML: 1.75 LIQUID ORAL at 04:06

## 2017-06-06 RX ADMIN — THIAMINE HCL TAB 100 MG 100 MG: 100 TAB at 12:06

## 2017-06-06 RX ADMIN — THERA TABS 1 TABLET: TAB at 12:06

## 2017-06-06 RX ADMIN — PANTOPRAZOLE SODIUM 40 MG: 40 TABLET, DELAYED RELEASE ORAL at 08:06

## 2017-06-06 NOTE — PT/OT/SLP PROGRESS
Physical Therapy  Treatment    Omari Diallo   MRN: [id number]   Admitting Diagnosis: Closed intertrochanteric fracture of left femur    PT Received On: 06/06/17  PT Start Time: 1310     PT Stop Time: 1325    PT Total Time (min): 15 min       Billable Minutes:  Gait [id number] and Therapeutic Exercise 7min    Treatment Type: Treatment  PT/PTA: PTA     PTA Visit Number: 5       General Precautions: Standard, fall  Orthopedic Precautions: LLE weight bearing as tolerated   Braces: N/A         Subjective:  Communicated with nurse Hendricks prior to session.  Agreed to ambulate.    Pain/Comfort  Pain Rating 1: 4/10  Location - Side 1: Left  Location - Orientation 1: generalized  Location 1: hip  Pain Addressed 1: Pre-medicate for activity, Reposition, Nurse notified    Objective:   Patient found with: telemetry    Functional Mobility:  Bed Mobility:        Transfers:  Sit <> Stand Assistance: Minimum Assistance  Sit <> Stand Assistive Device: Rolling Walker    Gait:   Gait Distance: 50'  Assistance 1: Minimum assistance  Gait Assistive Device: Rolling walker  Gait Pattern: 3-point gait  Gait Deviation(s): decreased otoniel, decreased velocity of limb motion, decreased weight-shifting ability    Stairs:      Balance:   Static Sit: FAIR: Maintains without assist, but unable to take any challenges   Dynamic Sit: FAIR+: Maintains balance through MINIMAL excursions of active trunk motion  Static Stand: FAIR: Maintains without assist but unable to take challenges  Dynamic stand: POOR: N/A     Therapeutic Activities and Exercises:  Seated in chair. Ambulated 50' with rw and Min A.  AROM BLE;s at 20 reps each : TKE's, Hip flexion, AP's.     AM-PAC 6 CLICK MOBILITY  How much help from another person does this patient currently need?   1 = Unable, Total/Dependent Assistance  2 = A lot, Maximum/Moderate Assistance  3 = A little, Minimum/Contact Guard/Supervision  4 = None, Modified [place]/Independent         AM-PAC Raw Score CMS  G-Code Modifier Level of Impairment Assistance   6 % Total / Unable   7 - 9 CM 80 - 100% Maximal Assist   10 - 14 CL 60 - 80% Moderate Assist   15 - 19 CK 40 - 60% Moderate Assist   20 - 22 CJ 20 - 40% Minimal Assist   23 CI 1-20% SBA / CGA   24 CH 0% Independent/ Mod I     Patient left up in chair with all lines intact, call button in reach and nurse Ruthie notified.    Assessment:  Omari Diallo is a 77 y.o. female with a medical diagnosis of Closed intertrochanteric fracture of left femur and presents with weakness.    Rehab identified problem list/impairments: Rehab identified problem list/impairments: weakness, impaired endurance, gait instability, decreased lower extremity function    Rehab potential is good.    Activity tolerance: Good    Discharge recommendations: Discharge Facility/Level Of Care Needs: home health PT, nursing facility, skilled     Barriers to discharge:      Equipment recommendations:       GOALS:    Physical Therapy Goals        Problem: Physical Therapy Goal    Goal Priority Disciplines Outcome Goal Variances Interventions   Physical Therapy Goal     PT/OT, PT Ongoing (interventions implemented as appropriate)     Description:  Goals to be met by: 2017     Patient will increase functional independence with mobility by performin). Supine to sit with Contact Guard Assistance  2). Sit to supine with Contact Guard Assistance  3). Sit to stand transfer with Stand-by Assistance  4). Gait  x > 50 feet with Stand-by Assistance using Rolling Walker.                       PLAN:    Patient to be seen BID (daily Sat and Sun)  to address the above listed problems via gait training, therapeutic activities, therapeutic exercises  Plan of Care expires: 17  Plan of Care reviewed with: patient         Kalyanivern Campo, PTA  2017

## 2017-06-06 NOTE — PROGRESS NOTES
I spoke to Leonor at [facility] [phone] , she stated that the husbands spouse just called her and has selected her facility. She is requesting an auth now.     I faxed the 142, PASRR, chest Xray and PPD to [phone]. Tracy Hernandez LMSW

## 2017-06-06 NOTE — PLAN OF CARE
Left message from Fiorella at [facility] to check on status on SNF authorization....MICHA Sparks CM

## 2017-06-06 NOTE — PLAN OF CARE
Problem: Patient Care Overview  Goal: Plan of Care Review  Outcome: Ongoing (interventions implemented as appropriate)  Patient AAOx4.  VSS.  Has remained afebrile this shift.  POC reviewed with patient.  Open discussion was facilitated.  Patient verbalized understanding.  Bed in lowest position, side rails up x2, call light within reach, and safety measures maintained throughout shift.  Patient has remained free of falls, trauma, and injury this shift.  Patient denies any needs at this time.  Will continue to monitor.

## 2017-06-06 NOTE — PLAN OF CARE
"Problem: Patient Care Overview  Goal: Plan of Care Review  Outcome: Ongoing (interventions implemented as appropriate)  Pt AAOx4. Pt denies pain at rest and complains of pain at a 2/10 with mobility which subsides immediately once pt is at rest. Pt with good appetite. Pt assisted on and off the bedside commode with assistance. Pt encouraged to drink the mag citrate ordered by Dr. Winn but pt states, "I just need time to think about drinking the mag citrate." Will continue to encourage pt to consume mag citrate due to constipation. Pt does crossword puzzles throughout this shift. Pt has no IV access per order from Dr. Winn. Pt transitioned to PO vitamin therapy this shift. Call light in easy reach.       "

## 2017-06-06 NOTE — PLAN OF CARE
06/06/17 0743   Patient Assessment/Suction   Level of Consciousness (AVPU) alert   Respiratory Effort Normal;Unlabored   All Lung Fields Breath Sounds diminished;clear   Cough Frequency infrequent   Cough Type good;nonproductive   PRE-TX-O2-ETCO2   O2 Device (Oxygen Therapy) room air   SpO2 96 %   Pulse Oximetry Type Intermittent   $ Pulse Oximetry - Multiple Charge Pulse Oximetry - Multiple   Pulse 80   Resp 16   Positioning Sitting in chair   Aerosol Therapy   $ Aerosol Therapy Charges PRN treatment not required   Inhaler   $ Inhaler Charges MDI (Metered Dose Inahler) Treatment   Respiratory Treatment Status given   SVN/Inhaler Treatment Route mouthpiece   Patient Tolerance good   Post-Treatment   Post-treatment Heart Rate (beats/min) 80   Post-treatment Resp Rate (breaths/min) 16   All Fields Breath Sounds unchanged

## 2017-06-06 NOTE — PT/OT/SLP PROGRESS
"               Smiley Rodríguez, OT  06/06/2017    Occupational Therapy  Treatment    MRN: [id number]   Admitting Diagnosis: Closed intertrochanteric fracture of left femur    OT Date of Treatment: 06/06/17   OT Start Time: 1650  OT Stop Time: 1703  OT Total Time (min): 13 min    Billable Minutes:  Self Care/Home Management 13  Total Minutes: 13    General Precautions: Standard, fall  Orthopedic Precautions: LLE weight bearing as tolerated  Braces: N/A    Do you have any cultural, spiritual, Buddhist conflicts, given your current situation?: none    Subjective:  Communicated with NurseRuthie prior to session.  Pt declined therapy other than toileting and transfer "I am going to the nursing home tomorrow and they will teach me all of the things I need to learn.  I do not want to do anything more today."    Pain/Comfort  Pain Rating 1: 0/10    Objective:  Patient found with: telemetry     Functional Mobility:  Bed Mobility:       Transfers:   Sit <> Stand Assistance: Contact Guard Assistance  Sit <> Stand Assistive Device: Rolling Walker  Toilet Transfer Technique: Stand Pivot  Toilet Transfer Assistance: Contact Guard Assistance  Toilet Transfer Assistive Device: Rolling Walker    Functional Ambulation:Not addressed.    Activities of Daily Living:         Toileting Where Assessed: Bedside commode  Toileting Level of Assistance: Contact guard        Balance:   Static Sit: Natalee sitting in bedside chair  Dynamic Sit: SBA in Sitting in bedside chair and on 3N1  Static Stand: CGA  Dynamic stand: CGA    Therapeutic Activities and Exercises:  Pt states she uses a rollator at home and sits on it and transports items with it.  She insists she will return to its use upon return home.  Safety with its use and good self alignment to a rollator reviewed with pt.  During transfer to and from 3N1, pt note to pivot on feet.  OTR attempted to have her weight shift during transfer and she stated she understood, but was unable to perform as " requested.  She initiate pulling up on walker with one hand, but followed instructions to push up from the arms of the chair and 3N1.           Patient left up in chair with call button in reach and eating her supper.  She was sitting in the bedside chair upon OT arrival with nurse in room completing care.    ASSESSMENT:  Omari Diallo is a 77 y.o. female with a medical diagnosis of Closed intertrochanteric fracture of left femur.  She was cooperative in performing toilet transfer and toileting.  She was receptive to verbal education and instruction.  She declined to participate in LB dressing training.  She declined to work on weight shifting with performance of transfer.     Rehab identified problem list/impairments: Rehab identified problem list/impairments: impaired self care skills, impaired functional mobilty, impaired endurance, decreased lower extremity function    Rehab potential is good.    Activity tolerance: Good    Discharge recommendations: Discharge Facility/Level Of Care Needs: home health OT, nursing facility, skilled     Barriers to discharge: Barriers to Discharge: Decreased caregiver support    Equipment recommendations:   None due to discharge to skilled nursing.    GOALS:    Occupational Therapy Goals        Problem: Occupational Therapy Goal    Goal Priority Disciplines Outcome Interventions   Occupational Therapy Goal     OT, PT/OT Ongoing (interventions implemented as appropriate)    Description:  Goals to be met by: 6-9-17     Patient will increase functional independence with ADLs by performing:    LE Dressing with Minimal Assistance.  Toileting from bedside commode over toilet with Minimal Assistance for hygiene and clothing management.   Stand pivot transfers with Minimal Assistance.  Toilet transfer to bedside commode over toilet with Minimal Assistance.                      Plan:  Patient to be seen 2 x/week, 3 x/week to address the above listed problems via self-care/home management,  therapeutic activities  Plan of Care expires: 07/02/17  Plan of Care reviewed with: patient         Smiley Shaferley, OT  06/06/2017

## 2017-06-06 NOTE — PROGRESS NOTES
I sent a SNF packet to Richelle at [facility] [phone] due to the pts spouse informing me that he is going to tour there this morning. Tracy Hernandez LMSW

## 2017-06-06 NOTE — PLAN OF CARE
Problem: Occupational Therapy Goal  Goal: Occupational Therapy Goal  Goals to be met by: 6-9-17     Patient will increase functional independence with ADLs by performing:    LE Dressing with Minimal Assistance.  Toileting from bedside commode over toilet with Minimal Assistance for hygiene and clothing management.   Stand pivot transfers with Minimal Assistance.  Toilet transfer to bedside commode over toilet with Minimal Assistance.     Outcome: Ongoing (interventions implemented as appropriate)  OT tx for education, self care and functional mobility.

## 2017-06-06 NOTE — PROGRESS NOTES
"Progress Note  Hospital Medicine  Patient Name:Omari Diallo  MRN:  [id number]  Patient Class: IP- Inpatient  Admit Date: 5/30/2017  Length of Stay: 7 days  Expected Discharge Date:   Attending Physician: New Winn MD  Primary Care Provider:  Juan J Aldrich MD    SUBJECTIVE:     Principal Problem: Closed intertrochanteric fracture of left femur  Initial history of present illness: Omari Diallo is a 77 y.o. With PMHx significant for alcohol abuse.  She was admitted to the service of hospital medicine with LEFT hip fracture.  She experienced a trip and fall while walking in her house.   She reports after falling she experienced LEFT hip pain and was unable to bear weight on the LEFT leg. She was drinking wine as she does every night since "the 80s".  She reports she recently cut back to 3 glasses of wine nightly.  She has been in ETOH rehab before.  She states she does get "shaky" when she doesn't drink.  She denies any seizure history.  She denies any headache, neck pain, back pain, abdominal pain, chest pain, or any other complaints.  She is complaining of left hip pain that is accompanied by muscle spasms. She reports the pain is improved with IV morphine and oral Robaxin. She denies any medical history.  She denies any previous cardiac workup.  She smokes 1/4 ppd for ~50 years.    PMH/PSH/SH/FH/Meds: reviewed.    Interval history: awaiting SNF placement    Symptoms/Review of Systems: Doing fine after hip surgery. No shortness of breath, cough, chest pain or headache, fever or abdominal pain.  Reports constipation.    Diet:  Adequate intake.    Activity level: Normal.    Pain:  stable    OBJECTIVE:   Vital Signs (Most Recent):      Temp: 98.5 °F (36.9 °C) (06/06/17 0743)  Pulse: 80 (06/06/17 0743)  Resp: 16 (06/06/17 0743)  BP: (!) 143/71 (06/06/17 0743)  SpO2: 96 % (06/06/17 0743)       Vital Signs Range (Last 24H):  Temp:  [98 °F (36.7 °C)-98.5 °F (36.9 °C)]   Pulse:  [67-87]   Resp:  [16-20]   BP: (116-171)/(57-79) "   SpO2:  [95 %-97 %]     Weight: 54.5 kg (120 lb 2.4 oz)  Body mass index is 20.62 kg/m².  No intake or output data in the 24 hours ending 06/06/17 1004  Physical Examination:  Constitutional: She is oriented to person, place, and time. No distress.   Cachectic    HENT:   Head: Normocephalic and atraumatic.   Eyes: Conjunctivae and EOM are normal. Pupils are equal, round, and reactive to light.   Neck: Normal range of motion. Neck supple. No thyromegaly present.   Cardiovascular: Normal rate, regular rhythm, normal heart sounds and intact distal pulses.    Pulmonary/Chest: Effort normal and breath sounds normal. No respiratory distress.   Abdominal: Soft. Bowel sounds are normal. She exhibits no distension. There is no tenderness.   Musculoskeletal: Left hip dressing C/D/I.  Neurological: She is alert and oriented to person, place, and time. No cranial nerve deficit.   Skin: Skin is warm and dry. Capillary refill takes less than 2 seconds. No pallor.     CBC:    Recent Labs  Lab 06/04/17 0536 06/05/17 0617 06/06/17  0459   WBC 8.40 8.90 8.10   RBC 2.77* 2.84* 2.72*   HGB 9.3* 9.5* 9.1*   HCT 26.9* 27.8* 26.8*    354* 381*   MCV 97 98 99*   MCH 33.6* 33.5* 33.6*   MCHC 34.5 34.2 34.1   BMP    Recent Labs  Lab 06/04/17 0536 06/05/17 0617 06/06/17  0459   GLU 97 92 101    138 139   K 3.2* 4.4 3.3*    106 105   CO2 29 23 25   BUN 5* 6* 7*   CREATININE 0.5 0.5 0.5   CALCIUM 8.7 9.1 9.0   MG 2.2  --   --       Diagnostic Results:  Microbiology Results (last 7 days)     Procedure Component Value Units Date/Time    Urine culture [[id number]]  (Susceptibility) Collected:  05/31/17 0944    Order Status:  Completed Specimen:  Urine from Urine, Catheterized Updated:  06/03/17 0729     Urine Culture, Routine --     ESCHERICHIA COLI  >100,000 cfu/ml         Left femur x-ray: Status post ORIF intertrochanteric fracture left femur with a long intramedullary nail and pin through the nail into the femoral head  with excellent apposition and angulation.    X-ray pelvis: Intertrochanteric fracture left femur.  Diffuse osteoporosis.  Degenerative disc disease at L4-5.    Left femur fracture: Difficult to visualize slightly angulated intertrochanteric fracture of the left hip.    CXR: Atherosclerosis otherwise negative chest.    Assessment/Plan:     * Closed intertrochanteric fracture of left femur s/p left intra-medullary hip nail placement     Continue to follow Orthopedic recommendations.  Needs aggressive incentive spirometry.  Follow hemoglobin and hematocrit closely.  Pain control with PO narcotics and antiemetics as needed.  Physical therapy as per Orthopedics protocol with fall precautions.  Await SNF placement.         Alcohol dependence with intoxication     Maintain fall and seizure precautions. Daily MVI, thiamine, folic acid.      Hypokalemia - better  Replete KCl. Follow BMP.    Constipation  Give a bottle of magnesium citrate now.      Fall on same level from tripping as cause of accidental injury     Resulting in hip fx.  Fall precautions in place.          UTI- E. Coli sp     Resolved, completed course antibiotics.       Dependence on nicotine from cigarettes     Health hazards associated with cigarette smoking were reviewed with patient and cessation was encouraged. Nicotine replacement and counseling options were discussed.         Await SNF placement.   VTE Risk Mitigation         Ordered     enoxaparin injection 40 mg  Every 24 hours (non-standard times)     Route:  Subcutaneous        05/31/17 2111     Medium Risk of VTE  Once      05/31/17 2224     Place BRYANT hose  Until discontinued      05/31/17 2224     Place sequential compression device  Until discontinued      05/31/17 2224     Place sequential compression device  Until discontinued      05/31/17 0105     Place BRYANT hose  Until discontinued      05/31/17 0105     Reason for No Pharmacological VTE Prophylaxis  Once      05/31/17 0105        Aqib  MD Pa  Department of Hospital Medicine   [facility]

## 2017-06-06 NOTE — PLAN OF CARE
Problem: Physical Therapy Goal  Goal: Physical Therapy Goal  Goals to be met by: 2017     Patient will increase functional independence with mobility by performin). Supine to sit with Contact Guard Assistance  2). Sit to supine with Contact Guard Assistance  3). Sit to stand transfer with Stand-by Assistance  4). Gait  x > 50 feet with Stand-by Assistance using Rolling Walker.      Outcome: Ongoing (interventions implemented as appropriate)  Ambulated 50' with rw and Min A, WBAT LLE.

## 2017-06-06 NOTE — PT/OT/SLP PROGRESS
Physical Therapy  Treatment    Omari Diallo   MRN: [id number]   Admitting Diagnosis: Closed intertrochanteric fracture of left femur    PT Received On: 06/06/17  PT Start Time: 0855     PT Stop Time: 0912    PT Total Time (min): 17 min       Billable Minutes:  Gait [id number] and Therapeutic Exercise 8min    Treatment Type: Treatment  PT/PTA: PTA     PTA Visit Number: 5       General Precautions: Standard, fall  Orthopedic Precautions: LLE weight bearing as tolerated   Braces: N/A         Subjective:  Communicated with nurse Hendricks prior to session.  Agreed to mobilize.    Pain/Comfort  Pain Rating 1: 4/10  Location - Side 1: Left  Location - Orientation 1: generalized  Location 1: hip  Pain Addressed 1: Pre-medicate for activity, Reposition, Nurse notified, Other (see comments) (reports pain with  movement)    Objective:   Patient found with: telemetry    Functional Mobility:  Bed Mobility:        Transfers:  Sit <> Stand Assistance: Minimum Assistance  Sit <> Stand Assistive Device: Rolling Walker    Gait:   Gait Distance: 50'  Assistance 1: Minimum assistance  Gait Assistive Device: Rolling walker  Gait Pattern: 3-point gait  Gait Deviation(s): decreased otoniel, decreased velocity of limb motion, decreased step length, decreased weight-shifting ability    Stairs:      Balance:   Static Sit: FAIR: Maintains without assist, but unable to take any challenges   Dynamic Sit: FAIR+: Maintains balance through MINIMAL excursions of active trunk motion  Static Stand: FAIR: Maintains without assist but unable to take challenges  Dynamic stand: POOR: N/A     Therapeutic Activities and Exercises:  Seated in chair.  Ambulated 50' with rw and Min A, WBAT LLE.  Performed LE exercises seated in chair at 20 reps each ; TKE's,  AP's, Hip flexion, Marches.     AM-PAC 6 CLICK MOBILITY  How much help from another person does this patient currently need?   1 = Unable, Total/Dependent Assistance  2 = A lot, Maximum/Moderate Assistance  3 =  A little, Minimum/Contact Guard/Supervision  4 = None, Modified [place]/Independent         AM-PAC Raw Score CMS G-Code Modifier Level of Impairment Assistance   6 % Total / Unable   7 - 9 CM 80 - 100% Maximal Assist   10 - 14 CL 60 - 80% Moderate Assist   15 - 19 CK 40 - 60% Moderate Assist   20 - 22 CJ 20 - 40% Minimal Assist   23 CI 1-20% SBA / CGA   24 CH 0% Independent/ Mod I     Patient left up in chair with all lines intact, call button in reach, nurse Ruthie notified and spouse present.    Assessment:  Omari Diallo is a 77 y.o. female with a medical diagnosis of Closed intertrochanteric fracture of left femur and presents with weakness, limited endurance, pain with activity.    Rehab identified problem list/impairments: Rehab identified problem list/impairments: weakness, impaired endurance, gait instability, decreased lower extremity function    Rehab potential is good.    Activity tolerance: Good    Discharge recommendations: Discharge Facility/Level Of Care Needs: home health PT, nursing facility, skilled     Barriers to discharge:      Equipment recommendations:       GOALS:    Physical Therapy Goals        Problem: Physical Therapy Goal    Goal Priority Disciplines Outcome Goal Variances Interventions   Physical Therapy Goal     PT/OT, PT Ongoing (interventions implemented as appropriate)     Description:  Goals to be met by: 2017     Patient will increase functional independence with mobility by performin). Supine to sit with Contact Guard Assistance  2). Sit to supine with Contact Guard Assistance  3). Sit to stand transfer with Stand-by Assistance  4). Gait  x > 50 feet with Stand-by Assistance using Rolling Walker.                       PLAN:    Patient to be seen BID (daily Sat and Sun)  to address the above listed problems via gait training, therapeutic activities, therapeutic exercises  Plan of Care expires: 17  Plan of Care reviewed with: patient, spouse          Kalyani Campo, PTA  06/06/2017

## 2017-06-06 NOTE — PROGRESS NOTES
Per Leonor at [facility] [phone] , all financial paperwork is completed and they are just waiting for [employer] auth to authorize.  Tracy Hernandez LMSW

## 2017-06-07 VITALS
WEIGHT: 120.13 LBS | DIASTOLIC BLOOD PRESSURE: 60 MMHG | HEART RATE: 84 BPM | OXYGEN SATURATION: 98 % | BODY MASS INDEX: 20.51 KG/M2 | RESPIRATION RATE: 16 BRPM | SYSTOLIC BLOOD PRESSURE: 118 MMHG | TEMPERATURE: 98 F | HEIGHT: 64 IN

## 2017-06-07 PROBLEM — M25.552 LEFT HIP PAIN: Status: ACTIVE | Noted: 2017-06-07

## 2017-06-07 LAB
ANION GAP SERPL CALC-SCNC: 7 MMOL/L
BASOPHILS # BLD AUTO: 0 K/UL
BASOPHILS NFR BLD: 0.3 %
BUN SERPL-MCNC: 8 MG/DL
CALCIUM SERPL-MCNC: 9.4 MG/DL
CHLORIDE SERPL-SCNC: 104 MMOL/L
CO2 SERPL-SCNC: 28 MMOL/L
CREAT SERPL-MCNC: 0.5 MG/DL
DIFFERENTIAL METHOD: ABNORMAL
EOSINOPHIL # BLD AUTO: 0.3 K/UL
EOSINOPHIL NFR BLD: 2.6 %
ERYTHROCYTE [DISTWIDTH] IN BLOOD BY AUTOMATED COUNT: 14 %
EST. GFR  (AFRICAN AMERICAN): >60 ML/MIN/1.73 M^2
EST. GFR  (NON AFRICAN AMERICAN): >60 ML/MIN/1.73 M^2
GLUCOSE SERPL-MCNC: 97 MG/DL
HCT VFR BLD AUTO: 28.3 %
HGB BLD-MCNC: 9.8 G/DL
LYMPHOCYTES # BLD AUTO: 1.9 K/UL
LYMPHOCYTES NFR BLD: 19.1 %
MCH RBC QN AUTO: 33.9 PG
MCHC RBC AUTO-ENTMCNC: 34.5 %
MCV RBC AUTO: 98 FL
MONOCYTES # BLD AUTO: 1.3 K/UL
MONOCYTES NFR BLD: 12.7 %
NEUTROPHILS # BLD AUTO: 6.6 K/UL
NEUTROPHILS NFR BLD: 65.3 %
PLATELET # BLD AUTO: 475 K/UL
PMV BLD AUTO: 7.4 FL
POTASSIUM SERPL-SCNC: 4.1 MMOL/L
RBC # BLD AUTO: 2.87 M/UL
SODIUM SERPL-SCNC: 139 MMOL/L
TB INDURATION 48 - 72 HR READ: 0 MM
WBC # BLD AUTO: 10.1 K/UL

## 2017-06-07 PROCEDURE — 63600175 PHARM REV CODE 636 W HCPCS: Performed by: ORTHOPAEDIC SURGERY

## 2017-06-07 PROCEDURE — 94640 AIRWAY INHALATION TREATMENT: CPT

## 2017-06-07 PROCEDURE — 36415 COLL VENOUS BLD VENIPUNCTURE: CPT

## 2017-06-07 PROCEDURE — 97535 SELF CARE MNGMENT TRAINING: CPT

## 2017-06-07 PROCEDURE — 25000003 PHARM REV CODE 250: Performed by: INTERNAL MEDICINE

## 2017-06-07 PROCEDURE — 94761 N-INVAS EAR/PLS OXIMETRY MLT: CPT

## 2017-06-07 PROCEDURE — 85025 COMPLETE CBC W/AUTO DIFF WBC: CPT

## 2017-06-07 PROCEDURE — 25000003 PHARM REV CODE 250: Performed by: NURSE PRACTITIONER

## 2017-06-07 PROCEDURE — 99900035 HC TECH TIME PER 15 MIN (STAT)

## 2017-06-07 PROCEDURE — 80048 BASIC METABOLIC PNL TOTAL CA: CPT

## 2017-06-07 PROCEDURE — 99239 HOSP IP/OBS DSCHRG MGMT >30: CPT | Mod: ,,, | Performed by: INTERNAL MEDICINE

## 2017-06-07 RX ORDER — ENOXAPARIN SODIUM 100 MG/ML
40 INJECTION SUBCUTANEOUS DAILY
Start: 2017-06-07 | End: 2017-06-30 | Stop reason: ALTCHOICE

## 2017-06-07 RX ORDER — LANOLIN ALCOHOL/MO/W.PET/CERES
100 CREAM (GRAM) TOPICAL DAILY
COMMUNITY
Start: 2017-06-07

## 2017-06-07 RX ORDER — HYDROCODONE BITARTRATE AND ACETAMINOPHEN 5; 325 MG/1; MG/1
1 TABLET ORAL EVERY 4 HOURS PRN
Qty: 10 TABLET | Refills: 0 | Status: SHIPPED | OUTPATIENT
Start: 2017-06-07

## 2017-06-07 RX ORDER — FLUTICASONE FUROATE AND VILANTEROL 100; 25 UG/1; UG/1
1 POWDER RESPIRATORY (INHALATION) DAILY
Start: 2017-06-07

## 2017-06-07 RX ORDER — IPRATROPIUM BROMIDE 0.5 MG/2.5ML
500 SOLUTION RESPIRATORY (INHALATION) EVERY 4 HOURS PRN
Refills: 0
Start: 2017-06-07 | End: 2018-06-07

## 2017-06-07 RX ORDER — FLUTICASONE PROPIONATE 50 MCG
2 SPRAY, SUSPENSION (ML) NASAL 2 TIMES DAILY
Refills: 0
Start: 2017-06-07 | End: 2017-06-30 | Stop reason: ALTCHOICE

## 2017-06-07 RX ADMIN — FLUTICASONE FUROATE AND VILANTEROL TRIFENATATE 1 PUFF: 100; 25 POWDER RESPIRATORY (INHALATION) at 07:06

## 2017-06-07 RX ADMIN — THERA TABS 1 TABLET: TAB at 08:06

## 2017-06-07 RX ADMIN — PANTOPRAZOLE SODIUM 40 MG: 40 TABLET, DELAYED RELEASE ORAL at 08:06

## 2017-06-07 RX ADMIN — THIAMINE HCL TAB 100 MG 100 MG: 100 TAB at 08:06

## 2017-06-07 RX ADMIN — FLUTICASONE PROPIONATE 2 SPRAY: 50 SPRAY, METERED NASAL at 08:06

## 2017-06-07 RX ADMIN — ENOXAPARIN SODIUM 40 MG: 100 INJECTION SUBCUTANEOUS at 08:06

## 2017-06-07 NOTE — PLAN OF CARE
Problem: Patient Care Overview  Goal: Plan of Care Review  Pt discharged.  Instructions discussed with patient and spouse, verbalized understanding.  No IV access.  Tolerating oob to chair.  BMs today.  Tolerating therapy.  Only c/o pain when ambulating but denies medication.  Staples to left leg.  Sites dry and intact and with out signs of redness/infection.  To [facility] today with belongings, discharge orders and prescriptions.  Report called to [facility].  Pt remained free from injury.  Frequent checks completed.

## 2017-06-07 NOTE — PLAN OF CARE
Problem: Occupational Therapy Goal  Goal: Occupational Therapy Goal  Goals to be met by: 6-9-17     Patient will increase functional independence with ADLs by performing:    LE Dressing with Minimal Assistance.           Outcome: Revised  OT goal for LB dressing is still appropriate; pt met all other OT goals as pt performing functional transfers (toilet) with CGA with RW and performed toileting with CGA.

## 2017-06-07 NOTE — PROGRESS NOTES
Report can be called to [facility] on [facility] at [phone] around noon and the pt will transport via their transport van. I updated the pts nurse. Tracy Hernandez LMSW

## 2017-06-07 NOTE — DISCHARGE INSTRUCTIONS
PT/OT eval and treat- WBAT to LLE  Fall Precautions   Turn q2 while in bed  Up to chair with all meals

## 2017-06-07 NOTE — PT/OT/SLP PROGRESS
"Occupational Therapy  Treatment    Omari Diallo   MRN: [id number]   Admitting Diagnosis: Closed left hip fracture    OT Date of Treatment: 06/07/17   OT Start Time: 1145  OT Stop Time: 1201  OT Total Time (min): 16 min    Billable Minutes:  Therapeutic Activity 16    General Precautions: Standard, fall  Orthopedic Precautions:    Braces:      Do you have any cultural, spiritual, Buddhist conflicts, given your current situation?: none    Subjective:  Communicated with nurse prior to session.  Spouse asking whether he should take pt's "gadgets" home or bring them to [facility]  Patient states, "I am going to [facility] today and I am not really interested in anything right now. The only thing I am wondering is whether I will be able to get up and go to the bathroom and use a walker while I am there. I am just not sure how well I can use the walker yet."       Objective:  Patient found with:  (seated in bedside chair )    Activities of Daily Living:   LE adaptive equipment: Reacher and Sock aid - Max (A) to don/doff shoes/socks using AE with verbal instruction - OTR providing demonstration as well; pt attempting to doff shoe/sock using AE - pt requires Max (A); pt with poor attention and OTR with difficulty re-directing patient    Balance:   Static Sit: GOOD: Takes MODERATE challenges from all directions  Dynamic Sit: GOOD-: Maintains balance through MODERATE excursions of active trunk movement,        Therapeutic Activities and Exercises:  - OTR retrieving adaptive equipment and reinforcing education regarding use to increase independence with LB dressing tasks  - OTR instructing on use of BSC over toilet to raise toilet ht and increase safety - pt/spouse verbalize understanding    AM-PAC 6 CLICK ADL   How much help from another person does this patient currently need?   1 = Unable, Total/Dependent Assistance  2 = A lot, Maximum/Moderate Assistance  3 = A little, Minimum/Contact Guard/Supervision  4 = None, Modified " "[place]/Independent          AM-PAC Raw Score CMS "G-Code Modifier Level of Impairment Assistance   6 % Total / Unable   7 - 8 CM 80 - 100% Maximal Assist   9-13 CL 60 - 80% Moderate Assist   14 - 19 CK 40 - 60% Moderate Assist   20 - 22 CJ 20 - 40% Minimal Assist   23 CI 1-20% SBA / CGA   24 CH 0% Independent/ Mod I       Patient left up in bedside chair with all lines intact, call button in reach, nurse notified and spouse present    ASSESSMENT:  Omari Diallo is a 77 y.o. female with a medical diagnosis of Closed left hip fracture and presents with continuing to require Max (A) to complete LB dressing tasks. Pt becoming with poor attention and disinterest in tasks.    Rehab identified problem list/impairments:      Rehab potential is good.    Activity tolerance: Fair    Discharge recommendations:       Barriers to discharge:      Equipment recommendations:       GOALS:    Occupational Therapy Goals        Problem: Occupational Therapy Goal    Goal Priority Disciplines Outcome Interventions   Occupational Therapy Goal     OT, PT/OT Revised    Description:  Goals to be met by: 6-9-17     Patient will increase functional independence with ADLs by performing:    LE Dressing with Minimal Assistance.                             Plan:  Patient to be seen 2 x/week, 3 x/week to address the above listed problems via self-care/home management, therapeutic activities  Plan of Care expires: 07/02/17  Plan of Care reviewed with: patient         HUGO Oakes LOTR  06/07/2017  "

## 2017-06-07 NOTE — PLAN OF CARE
06/07/17 0744   Patient Assessment/Suction   Level of Consciousness (AVPU) alert   Respiratory Effort Normal;Unlabored   All Lung Fields Breath Sounds diminished;clear   Cough Frequency infrequent   Cough Type good   PRE-TX-O2-ETCO2   O2 Device (Oxygen Therapy) room air   SpO2 97 %   Pulse Oximetry Type Intermittent   $ Pulse Oximetry - Multiple Charge Pulse Oximetry - Multiple   Pulse 80   Resp 16   Positioning Sitting in chair   Aerosol Therapy   $ Aerosol Therapy Charges PRN treatment not required   Inhaler   $ Inhaler Charges MDI (Metered Dose Inahler) Treatment   Respiratory Treatment Status given   SVN/Inhaler Treatment Route mouthpiece   Patient Tolerance good   Post-Treatment   Post-treatment Heart Rate (beats/min) 80   Post-treatment Resp Rate (breaths/min) 16   All Fields Breath Sounds unchanged

## 2017-06-07 NOTE — PROGRESS NOTES
Per Leonor at [facility] [phone], they have received [employer] auth. I sent the pts dc orders, AVS, and prescriptions to [facility] at [phone]. Tracy Hernandez LMSW

## 2017-06-07 NOTE — PT/OT/SLP PROGRESS
Physical Therapy      Omari Diallo  MRN: [id number]    Patient not seen today secondary to waiting for transfer to [facility] and would like to stay near the toilet in event of BM  . To d/c from PT upon transfer to nursing facility    Dory Michael, PT

## 2017-06-07 NOTE — DISCHARGE SUMMARY
"Discharge Summary  Hospital Medicine    Admit Date: 5/30/2017    Date and Time: 6/7/20179:40 AM    Discharge Attending Physician: New Winn MD    Primary Care Physician: Juan J Aldrich MD    Diagnoses:  Active Hospital Problems    Diagnosis  POA    *Closed intertrochanteric fracture of left femur [S72.142A]  Yes    Fall on same level from tripping as cause of accidental injury [W01.0XXA]  Yes    Alcohol dependence with intoxication [F10.229]  Yes    Dependence on nicotine from cigarettes [F17.210]  Yes    Abnormal EKG [R94.31]  Yes    Leukocytosis [D72.829]  Yes      Resolved Hospital Problems    Diagnosis Date Resolved POA   No resolved problems to display.     Discharged Condition: Good    Hospital Course:   Omari Diallo is a 77 y.o. With PMHx significant for alcohol abuse.  She was admitted to the service of hospital medicine with LEFT hip fracture.  She experienced a trip and fall while walking in her house.   She reports after falling she experienced LEFT hip pain and was unable to bear weight on the LEFT leg. She was drinking wine as she did every night since "the 80s".  She reports she recently cut back to 3 glasses of wine nightly.  She has been in ETOH rehab before.  She states she does get "shaky" when she doesn't drink.  She denied any seizure history.  She denies any headache, neck pain, back pain, abdominal pain, chest pain, or any other complaints.  She was complaining of left hip pain that is accompanied by muscle spasms. She reported the pain was improved with IV morphine and oral Robaxin. She denied any medical history.  She denies any previous cardiac workup.  She smokes 1/4 ppd for ~50 years. Patient was admitted to Hospitalist medicine service. Patient was evaluated by Dr. Segovia who performed hip fracture repair surgery. Patient also watch for alcohol withdrawal. Patient appears deconditioned and has been accepted for SNF for further care. Patient was discharged to SNF in stable " condition with following discharge plan of care. Total time with the patient was 30 minutes and greater than 50% was spent in counseling and coordination of care. The assessment and plan have been discussed at length. Physicians' notes reviewed. Labs and procedure reviewed.     Consults: Dr. Segovia    Significant Diagnostic Studies:    Left femur x-ray: Status post ORIF intertrochanteric fracture left femur with a long intramedullary nail and pin through the nail into the femoral head with excellent apposition and angulation.     X-ray pelvis: Intertrochanteric fracture left femur.  Diffuse osteoporosis.  Degenerative disc disease at L4-5.     Left femur fracture: Difficult to visualize slightly angulated intertrochanteric fracture of the left hip.     CXR: Atherosclerosis otherwise negative chest.    Microbiology Results (last 7 days)     Procedure Component Value Units Date/Time    Urine culture [[id number]]  (Susceptibility) Collected:  05/31/17 0944    Order Status:  Completed Specimen:  Urine from Urine, Catheterized Updated:  06/03/17 0729     Urine Culture, Routine --     ESCHERICHIA COLI  >100,000 cfu/ml          Special Treatments/Procedures: as above  Disposition: SNF    Medications:  Reconciled Home Medications: Current Discharge Medication List      START taking these medications    Details   enoxaparin (LOVENOX) 40 mg/0.4 mL Syrg Inject 0.4 mLs (40 mg total) into the skin once daily at 6am.      fluticasone (FLONASE) 50 mcg/actuation nasal spray 2 sprays by Each Nare route 2 (two) times daily.  Refills: 0      fluticasone-vilanterol (BREO) 100-25 mcg/dose diskus inhaler Inhale 1 puff into the lungs once daily. Controller      hydrocodone-acetaminophen 5-325mg (NORCO) 5-325 mg per tablet Take 1 tablet by mouth every 4 (four) hours as needed.  Qty: 10 tablet, Refills: 0      ipratropium (ATROVENT) 0.02 % nebulizer solution Take 2.5 mLs (500 mcg total) by nebulization every 4 (four) hours as needed (SOB).  Rescue  Refills: 0      multivitamin (THERAGRAN) tablet Take 1 tablet by mouth once daily.      thiamine 100 MG tablet Take 1 tablet (100 mg total) by mouth once daily.             Discharge Procedure Orders  Diet general   Order Comments: Cardiac/ 2 gram sodium low cholesterol diet     Other restrictions (specify):   Order Comments: Fall precautions     Call MD for:   Order Comments: For worsening symptoms, chest pain, shortness of breath, increased abdominal pain, high grade fever, stroke or stroke like symptoms, immediately go to the nearest Emergency Room or call 911 as soon as possible.       Follow-up Information     [facility].    Why:  SNF, Nursing Home  Contact information:  [address] [facility] [address] [phone]             Juan J Aldrich MD In 2 weeks.    Specialty:  Internal Medicine  Contact information:  [address] [facility] [place] [address] [phone]             Mau Segovia MD In 1 week.    Specialty:  Orthopedic Surgery  Contact information:  [address] [address] [address] [phone]

## 2017-06-08 ENCOUNTER — PATIENT OUTREACH (OUTPATIENT)
Dept: ADMINISTRATIVE | Facility: CLINIC | Age: 77
End: 2017-06-08
Payer: COMMERCIAL

## 2017-06-08 NOTE — PROGRESS NOTES
C3 nurse attempted to conduct a POST ACUTE 1 call with [facility].  Spoke with TARAH Hendricks at [facility].  She is unable to conduct call at this time and requests to return call.

## 2017-06-08 NOTE — PLAN OF CARE
06/08/17 0835   Final Note   Assessment Type Final Discharge Note   Discharge Disposition SNF   Discharge planning education complete? Yes

## 2017-06-30 ENCOUNTER — PATIENT OUTREACH (OUTPATIENT)
Dept: ADMINISTRATIVE | Facility: CLINIC | Age: 77
End: 2017-06-30

## 2017-07-10 ENCOUNTER — OFFICE VISIT (OUTPATIENT)
Dept: PODIATRY | Facility: CLINIC | Age: 77
End: 2017-07-10
Payer: COMMERCIAL

## 2017-07-10 VITALS — BODY MASS INDEX: 20.51 KG/M2 | HEIGHT: 64 IN | WEIGHT: 120.13 LBS

## 2017-07-10 DIAGNOSIS — M79.675 PAIN IN TOES OF BOTH FEET: ICD-10-CM

## 2017-07-10 DIAGNOSIS — B35.1 ONYCHOMYCOSIS DUE TO DERMATOPHYTE: Primary | ICD-10-CM

## 2017-07-10 DIAGNOSIS — M79.674 PAIN IN TOES OF BOTH FEET: ICD-10-CM

## 2017-07-10 PROCEDURE — 1159F MED LIST DOCD IN RCRD: CPT | Mod: S$GLB,,, | Performed by: PODIATRIST

## 2017-07-10 PROCEDURE — 1126F AMNT PAIN NOTED NONE PRSNT: CPT | Mod: S$GLB,,, | Performed by: PODIATRIST

## 2017-07-10 PROCEDURE — 99999 PR PBB SHADOW E&M-EST. PATIENT-LVL II: CPT | Mod: PBBFAC,,, | Performed by: PODIATRIST

## 2017-07-10 PROCEDURE — 99202 OFFICE O/P NEW SF 15 MIN: CPT | Mod: S$GLB,,, | Performed by: PODIATRIST

## 2017-07-10 PROCEDURE — 17999 UNLISTD PX SKN MUC MEMB SUBQ: CPT | Mod: CSM,,, | Performed by: PODIATRIST

## 2017-07-10 NOTE — PROGRESS NOTES
Subjective:      Patient ID: Omari Diallo is a 77 y.o. female.    Chief Complaint: Foot Problem    Painful toenails all ten toes.  Gradual onset, worsening over past several weeks, aggravated by increased weight bearing, shoe gear, pressure.  No previous medical treatment.  OTC pain med not helping.  Denies trauma and signs infection both feet all toes.      ROS        Objective:      Physical Exam   Constitutional: She is oriented to person, place, and time. She appears well-developed and well-nourished. She is cooperative.   Oriented to time, place, and person.   Cardiovascular:   Pulses:       Dorsalis pedis pulses are 1+ on the right side, and 1+ on the left side.        Posterior tibial pulses are 1+ on the right side, and 1+ on the left side.   Capillary fill time 3-5 seconds.  All toes warm to touch.      Negative lower extremity edema bilateral.    Negative elevational pallor and dependent rubor bilateral.     Musculoskeletal:   Normal angle, base, station of gait. Decreased stride length, early heel off, moderately propulsive toe off bilateral.    All ten toes without clubbing, cyanosis, or signs of ischemia.      No pain to palpation bilateral lower extremities.      Range of motion, stability, muscle strength, and muscle tone are age and health appropriate normal bilateral feet and legs.       Lymphadenopathy:   Negative lymphadenopathy bilateral popliteal fossa and tarsal tunnel.     Neurological: She is alert and oriented to person, place, and time. She has normal strength. She is not disoriented. She displays no atrophy and no tremor. No sensory deficit. She exhibits normal muscle tone.   Reflex Scores:       Patellar reflexes are 2+ on the right side and 2+ on the left side.       Achilles reflexes are 2+ on the right side and 2+ on the left side.    Negative tinel sign to percussion sural, superficial peroneal, deep peroneal, saphenous, and posterior tibial nerves right and left ankles and feet.      Skin: Skin is warm, dry and intact. No abrasion, no bruising, no burn, no ecchymosis, no laceration, no lesion, no petechiae and no rash noted. She is not diaphoretic. No cyanosis or erythema. No pallor. Nails show no clubbing.   Skin thin, atrophic, with decreased density and distribution of pedal hair bilateral, but without hyperpigmentation, smith discoloration,  ulcers, masses, nodules or cords palpated bilateral feet and legs.      Toenails 1st, 2nd, 3rd, 4th, 5th  bilateral are hypertrophic thickened 2-3 mm, dystrophic, discolored tanish brown with tan, gray crumbly subungual debris.  Tender to distal nail plate pressure, without periungual skin abnormality of each.               Assessment:       Encounter Diagnoses   Name Primary?    Onychomycosis due to dermatophyte Yes    Pain in toes of both feet          Plan:       Omari was seen today for foot problem.    Diagnoses and all orders for this visit:    Onychomycosis due to dermatophyte    Pain in toes of both feet      I counseled the patient on her conditions, their implications and medical management.        Discussed conservative treatment with shoes of adequate dimensions, material, and style to alleviate symptoms and delay or prevent surgical intervention.    Non covered foot care:    With the patient's permission, I debrided all ten toenails with a sterile nipper and curette, removing all offending nail and debris.  Patient tolerated the procedure well and related significant relief.              Return if symptoms worsen or fail to improve.

## 2017-08-13 NOTE — NURSING
"Pt states Dr. Roe told her he would discontinue the "heart box". Informed pt that the order for her telemetry was not discontinued at this time.   "
Pt has not had a bowel movement since surgery. Notified Dr. Winn regarding pt constipation. New orders for 1 bottle of mag citrate obtained.   
Pt now agreable to take the PPD skin test at this time. Notified pharmacy to send this med.   
transfer training/balance training/Stair Training/gait training/bed mobility training/strengthening

## 2018-12-03 ENCOUNTER — TELEPHONE (OUTPATIENT)
Dept: SURGERY | Facility: CLINIC | Age: 78
End: 2018-12-03

## 2022-12-20 NOTE — PLAN OF CARE
Problem: Patient Care Overview  Goal: Plan of Care Review  Pt vital signs stable at this time. Pt s/p Lt femur nailing 5/31/17 per Dr Segovia, ollie intact. Pt denies pain so far this shift. Pt transfers to BSC with 1 person stand by assist. PPD placed to Lt FA 6/5/17. Pt has no IV access, MD aware. Mag citrate given this shift, BM x1. Call light within pt reach, pt instructed to call staff for any needed assistance, Q2hr frequent performed, safety maintained. Pt denies needs/concerns at this time, will continue to monitor.        5-Fu Counseling: 5-Fluorouracil Counseling:  I discussed with the patient the risks of 5-fluorouracil including but not limited to erythema, scaling, itching, weeping, crusting, and pain.

## (undated) DEVICE — PADDING CAST 6IN

## (undated) DEVICE — PACK CUSTOM UNIV BASIN SLI

## (undated) DEVICE — SOL 9P NACL IRR PIC IL

## (undated) DEVICE — SEE MEDLINE ITEM 157131

## (undated) DEVICE — DRESSING AQUACEL AG SILVER 4X4

## (undated) DEVICE — LINER SUCTION 3000CC

## (undated) DEVICE — SEE MEDLINE ITEM 152764

## (undated) DEVICE — STAPLER SKIN ROTATING HEAD

## (undated) DEVICE — DRAPE C-ARM FOR MOBILE XRAY

## (undated) DEVICE — UNDERGLOVES BIOGEL PI SIZE 7.5

## (undated) DEVICE — DRAPE STERI U-SHAPED 47X51IN

## (undated) DEVICE — SUT 0 VICRYL / CT-1

## (undated) DEVICE — WIRE GUIDE 3.2MM 400MM
Type: IMPLANTABLE DEVICE | Site: FEMUR | Status: NON-FUNCTIONAL
Removed: 2017-05-31

## (undated) DEVICE — SEE MEDLINE ITEM 157116

## (undated) DEVICE — APPLICATOR CHLORAPREP ORN 26ML

## (undated) DEVICE — DRESSING TEGADERM 2X2 3/4

## (undated) DEVICE — DRAPE C-ARMOR EQUIPMENT COVER

## (undated) DEVICE — SUT ETHILON 3-0 FS-1 30

## (undated) DEVICE — SLEEVE SCD EXPRESS CALF MEDIUM

## (undated) DEVICE — UNDERGLOVES BIOGEL PI SIZE 8

## (undated) DEVICE — BIT DRILL 4.2MM 3 FLUTD 145MM

## (undated) DEVICE — DRAPE IOBAN 2 STERI

## (undated) DEVICE — SUT 2-0 VICRYL / CT-1

## (undated) DEVICE — SEE MEDLINE ITEM 157117

## (undated) DEVICE — GLOVE SURG ULTRA TOUCH 7.5

## (undated) DEVICE — DRAPE STERI-DRAPE 1000 17X11IN

## (undated) DEVICE — GLOVE SURG ULTRA TOUCH 8

## (undated) DEVICE — SEE MEDLINE ITEM 152530

## (undated) DEVICE — IMPLANTABLE DEVICE
Type: IMPLANTABLE DEVICE | Site: FEMUR | Status: NON-FUNCTIONAL
Removed: 2017-05-31

## (undated) DEVICE — BANDAGE ACE 6 NSTRL

## (undated) DEVICE — PADDING CAST 4IN SPECIALIST

## (undated) DEVICE — ELECTRODE REM PLYHSV RETURN 9